# Patient Record
Sex: MALE | Race: WHITE | Employment: FULL TIME | URBAN - METROPOLITAN AREA
[De-identification: names, ages, dates, MRNs, and addresses within clinical notes are randomized per-mention and may not be internally consistent; named-entity substitution may affect disease eponyms.]

---

## 2018-09-24 ENCOUNTER — OFFICE VISIT (OUTPATIENT)
Dept: ORTHOPEDIC SURGERY | Age: 38
End: 2018-09-24
Payer: COMMERCIAL

## 2018-09-24 VITALS
HEIGHT: 73 IN | DIASTOLIC BLOOD PRESSURE: 100 MMHG | HEART RATE: 87 BPM | SYSTOLIC BLOOD PRESSURE: 127 MMHG | BODY MASS INDEX: 33.13 KG/M2 | WEIGHT: 250 LBS

## 2018-09-24 DIAGNOSIS — M22.2X2 PATELLOFEMORAL PAIN SYNDROME OF LEFT KNEE: ICD-10-CM

## 2018-09-24 DIAGNOSIS — M25.562 LEFT KNEE PAIN, UNSPECIFIED CHRONICITY: Primary | ICD-10-CM

## 2018-09-24 DIAGNOSIS — M65.9 SYNOVITIS OF LEFT KNEE: ICD-10-CM

## 2018-09-24 PROBLEM — M65.962 SYNOVITIS OF LEFT KNEE: Status: ACTIVE | Noted: 2018-09-24

## 2018-09-24 PROCEDURE — 99203 OFFICE O/P NEW LOW 30 MIN: CPT | Performed by: FAMILY MEDICINE

## 2018-09-24 PROCEDURE — 20610 DRAIN/INJ JOINT/BURSA W/O US: CPT | Performed by: FAMILY MEDICINE

## 2018-09-24 RX ORDER — DICLOFENAC SODIUM 75 MG/1
75 TABLET, DELAYED RELEASE ORAL 2 TIMES DAILY
Qty: 60 TABLET | Refills: 3 | Status: SHIPPED | OUTPATIENT
Start: 2018-09-24 | End: 2020-07-14

## 2018-09-24 NOTE — PROGRESS NOTES
BP: (!) 127/100   Pulse: 87       General Exam:     Constitutional: Patient is adequately groomed with no evidence of malnutrition  DTRs: Deep tendon reflexes are intact  Mental Status: The patient is oriented to time, place and person. The patient's mood and affect are appropriate. Lymphatic: The lymphatic examination bilaterally reveals all areas to be without enlargement or induration. Vascular: Examination reveals no swelling or calf tenderness. Peripheral pulses are palpable and 2+. Neurological: The patient has good coordination. There is no weakness or sensory deficit. Knee Examination  Inspection:  There is no high-grade deformity of the hamate does have a slight suprapatellar effusion. No erythematous changes or signs is a Septic joint. Palpation:  He does have tenderness of the lateral rhythm medial patellofemoral facet. Definite pain with patellar grind testing. He does not exhibit much in the way of joint line tenderness at this time. Rang of Motion:  He is able to completely extend but flexion is significantly limited to 60-70° resulting in anterior knee pain. Hamstrings are very tight. Strength:  4 out of 5 with flexion and extension. No evidence of extensor mechanism disruption. Special Tests:  He does have a very positive patellar grind test.  He does have some pain with Herman's testing but is primarily anterior in nature due to inability to fully flex his knee. I do not sense high-grade instability although he is guarding substantially. Skin: There are no rashes, ulcerations or lesions. Distal neurovascular exam is intact. Gait: Moderate altalgia    Reflex symmetrically preserved    Additional Comments:     Additional Examinations:  Contralateral Exam: Examination of the right knee reveals intact skin. There is no focal tenderness. The patient demonstrates full painless range of motion with regards to flexion and extension.   Strength is 5/5 thorough out all planes. Ligamentous stability is grossly intact. Examination of the bilateral hip reveals intact skin. The patient demonstrates full painless range of motion with regards to flexion, abduction, internal and external rotation. There is not tenderness about the greater trochanter. There is a negative straight leg raise against resistance. Strength is 5/5 thorough out all planes. Right Lower Extremity: Examination of the right lower extremity does not show any tenderness, deformity or injury. Range of motion is unremarkable. There is no gross instability. There are no rashes, ulcerations or lesions. Strength and tone are normal.  Left Lower Extremity: Examination of the left lower extremity does not show any tenderness, deformity or injury. Range of motion is unremarkable. There is no gross instability. There are no rashes, ulcerations or lesions. Strength and tone are normal.      Diagnostic Test Findings: Left knee AP and PA weight-bearing,sunrise, and lateral films are obtained today and does not show any evidence of high-grade degenerative changes or osseous injuries. If minimal tilt on sunrise view. Assessment :  #1.  3 days status post acute worsening left anterior knee pain with knee synovitis and suspected patellofemoral compression syndrome    Impression:  Encounter Diagnoses   Name Primary?     Left knee pain, unspecified chronicity Yes    Synovitis of left knee     Patellofemoral pain syndrome of left knee        Office Procedures:  Orders Placed This Encounter   Procedures    XR KNEE LEFT (MIN 4 VIEWS)    Uric Acid     Standing Status:   Future     Standing Expiration Date:   9/24/2019    CBC with Differential    Ambulatory referral to Physical Therapy     Referral Priority:   Routine     Referral Type:   Eval and Treat     Referral Reason:   Specialty Services Required     Requested Specialty:   Physical Therapy     Number of Visits Requested:   1    [id number] - CO DRAIN/INJECT LARGE JOINT/BURSA    IL BETAMETHASONE ACET&SOD PHOSP     Left hip       Treatment Plan:  Treatment options were discussed with Jay Marty. We did review his plain films and exam findings. He is certainly most clinically tender anteriorly involving the knee but there is no real history of injury or precipitating activity prior to becoming symptomatic this weekend. I would like for him to get blood work checking a uric acid and CBC. We started him on diclofenac 75 mg 1 pill twice daily and was encouraged to use his home wraparound knee brace which she has left over from his previous right knee surgery several years ago. We will see how he does initially with a couple weeks of supervised therapy and we did opt to inject his left knee today using 2 mL of Celestone, 2 of Marcaine, 1 of Xylocaine. We'll see him back in 2 weeks and consider imaging if he is failing to improve with he will contact us with questions or concerns. Icing and activity modification was discussed. This dictation was performed with a verbal recognition program (DRAGON) and it was checked for errors. It is possible that there are still dictated errors within this office note. If so, please bring any errors to my attention for an addendum. All efforts were made to ensure that this office note is accurate.

## 2019-03-18 ENCOUNTER — OFFICE VISIT (OUTPATIENT)
Dept: ORTHOPEDIC SURGERY | Age: 39
End: 2019-03-18
Payer: COMMERCIAL

## 2019-03-18 VITALS
HEIGHT: 73 IN | HEART RATE: 91 BPM | BODY MASS INDEX: 33.13 KG/M2 | SYSTOLIC BLOOD PRESSURE: 151 MMHG | DIASTOLIC BLOOD PRESSURE: 95 MMHG | WEIGHT: 250 LBS

## 2019-03-18 DIAGNOSIS — M25.561 PAIN IN JOINT OF RIGHT KNEE: Primary | ICD-10-CM

## 2019-03-18 PROCEDURE — 99213 OFFICE O/P EST LOW 20 MIN: CPT | Performed by: PHYSICIAN ASSISTANT

## 2019-03-18 RX ORDER — NAPROXEN 500 MG/1
500 TABLET ORAL 2 TIMES DAILY WITH MEALS
Qty: 60 TABLET | Refills: 0 | Status: SHIPPED | OUTPATIENT
Start: 2019-03-18 | End: 2020-07-14

## 2019-03-25 ENCOUNTER — OFFICE VISIT (OUTPATIENT)
Dept: ORTHOPEDIC SURGERY | Age: 39
End: 2019-03-25
Payer: COMMERCIAL

## 2019-03-25 VITALS — HEIGHT: 73 IN | WEIGHT: 250 LBS | BODY MASS INDEX: 33.13 KG/M2

## 2019-03-25 DIAGNOSIS — M25.561 PAIN IN JOINT OF RIGHT KNEE: Primary | ICD-10-CM

## 2019-03-25 DIAGNOSIS — M25.461 EFFUSION OF RIGHT KNEE: ICD-10-CM

## 2019-03-25 PROCEDURE — 99213 OFFICE O/P EST LOW 20 MIN: CPT | Performed by: ORTHOPAEDIC SURGERY

## 2019-03-25 PROCEDURE — 20610 DRAIN/INJ JOINT/BURSA W/O US: CPT | Performed by: ORTHOPAEDIC SURGERY

## 2019-04-10 ENCOUNTER — OFFICE VISIT (OUTPATIENT)
Dept: ORTHOPEDIC SURGERY | Age: 39
End: 2019-04-10
Payer: COMMERCIAL

## 2019-04-10 VITALS — WEIGHT: 250 LBS | HEIGHT: 72 IN | BODY MASS INDEX: 33.86 KG/M2

## 2019-04-10 DIAGNOSIS — M25.461 EFFUSION OF RIGHT KNEE: Primary | ICD-10-CM

## 2019-04-10 PROCEDURE — 99213 OFFICE O/P EST LOW 20 MIN: CPT | Performed by: ORTHOPAEDIC SURGERY

## 2019-04-10 NOTE — PROGRESS NOTES
than aspirin, has not seen Dr Basil Lomax recently but told Dr will give clearance for surgery    High blood pressure        Past Surgical History:  Past Surgical History:   Procedure Laterality Date    CORONARY ANGIOPLASTY WITH STENT PLACEMENT  2014    KNEE ARTHROSCOPY Right 09/22/2016    Medial Collateral Ligament Repair with removal of loose bodies       Allergies:  No Known Allergies    Medications:  Current Outpatient Medications   Medication Sig Dispense Refill    naproxen (NAPROSYN) 500 MG tablet Take 1 tablet by mouth 2 times daily (with meals) 60 tablet 0    diclofenac (VOLTAREN) 75 MG EC tablet Take 1 tablet by mouth 2 times daily 60 tablet 3    Multiple Vitamins-Minerals (THERAPEUTIC MULTIVITAMIN-MINERALS) tablet Take 1 tablet by mouth daily      aspirin 325 MG tablet Take 325 mg by mouth daily       No current facility-administered medications for this visit. Review of Systems:  Bhavnanaveed Kim review of systems has been performed by intake and observation. All past and current ROS forms have been scanned into the medical record. She has been instructed to contact her primary care provider regarding ROS issues if not already being addressed at this time. There are no recent changes. The most recent ROS was scanned into media on 03/18/2019       OBJECTIVE  PHYSICAL EXAM  Vital Signs: There were no vitals filed for this visit. Body mass index is 33.91 kg/m². General Exam:   Constitutional: Patient is adequately groomed with no evidence of malnutrition  Mental Status: The patient is oriented to time, place and person. The patient's mood and affect are appropriate. Vascular: Examination reveals no swelling or calf tenderness. Peripheral pulses are palpable and 2+. Neurological: The patient has good coordination. There is no weakness or sensory deficit. Right Knee Examination  Inspection:   Knee alignment: neutral  No swelling noted, no effusion  No erythema or ecchymosis. Skin is intact with no cellulitis, rashes, ulcerations, lymphedema or cutaneous lesions noted. Gait: Today's gait normal.    Palpation: No tenderness and no effusion. Range of Motion:  Full, but with discomfort with full flexion    Special Tests:  Lachman test: negative       Anterior drawer: negative       Posterior drawer: negative       Herman's test:Negative       Varus laxity at 30 degrees: negative       Valgus laxity at 30 degrees: positive    Strength: no gross motor weakness noted. Motor exam of the lower extremities show quadriceps, hamstrings, foot dorsiflexion and plantarflexion grossly intact. Neurologic & vascular: Sensation to both feet is grossly intact to light touch. The bilateral lower extremities are warm and well-perfused with brisk capillary refill. Additional Examinations:  Left Lower Extremity: Examination of the left lower extremity does not show any tenderness, deformity or injury. Range of motion is within normal limits. There is no gross instability. There are no rashes, ulcerations or lesions. Strength and tone are normal.        ASSESSMENT (Medical Decision Making)    Leonardo Madison is a 45 y.o. male with the following diagnosis: Right knee Symptoms have currently resolved. He has some minimal tightness posteriorly but not really pain. ICD-10-CM    1. Effusion of right knee M25.461        His overall course is responding adequately to ongoing treatment      PLAN (Medical Decision Making)  Office Procedures:  No orders of the defined types were placed in this encounter. Treatment Plan: Today we discussed his plan. We will allow him to continue to progress his activity level slowly. He has been using a simple knee support and at least for now we'll continue this knee support. His knee symptoms recur. Would suggest that he obtain an MRI scan. Ryan prepare for that. And then he'll follow-up with us for reevaluation.   Otherwise he can progress his activities as tolerated. Non-steroidal anti-inflammatories medications (NSAIDs) can be used to assist with pain control and to reduce inflammatory changes. These medications may be over-the-counter or prescribed. We discussed taking the NSAID properly and the precautions. The patient understands that this medication may potentially interfere with other medications. Patient was also instructed to immediately discontinue the medication is there is any possible complication. Kathe Amaya was instructed to call the office if his symptoms worsen or if new symptoms appear prior to the next scheduled visit. He is specifically instructed to contact the office between now and schedule appointment if he has concerns related to his condition or if he needs assistance in scheduling any above tests. He is welcome to call for an appointment sooner if he has any additional concerns or questions. This dictation was performed with a verbal recognition program (DRAGON) and it was checked for errors. It is possible that there are still dictated errors within this office note. If so, please bring any errors to my attention for an addendum. All efforts were made to ensure that this office note is accurate.

## 2020-03-06 ENCOUNTER — OFFICE VISIT (OUTPATIENT)
Dept: ORTHOPEDIC SURGERY | Age: 40
End: 2020-03-06
Payer: COMMERCIAL

## 2020-03-06 VITALS — BODY MASS INDEX: 33.86 KG/M2 | HEIGHT: 72 IN | WEIGHT: 250 LBS

## 2020-03-06 PROCEDURE — 99213 OFFICE O/P EST LOW 20 MIN: CPT | Performed by: PHYSICIAN ASSISTANT

## 2020-03-06 RX ORDER — ATORVASTATIN CALCIUM 80 MG/1
80 TABLET, FILM COATED ORAL DAILY
COMMUNITY
Start: 2019-09-13

## 2020-03-06 RX ORDER — METHYLPREDNISOLONE 4 MG/1
TABLET ORAL
Qty: 1 KIT | Refills: 0 | Status: SHIPPED | OUTPATIENT
Start: 2020-03-06 | End: 2020-06-17

## 2020-03-06 RX ORDER — CARVEDILOL 6.25 MG/1
6.25 TABLET ORAL DAILY
COMMUNITY
Start: 2017-12-20

## 2020-03-06 NOTE — PROGRESS NOTES
Patient: Aramis Vernon    MRN: [id number]  YOB: 1980          Age: 44 y.o. Sex: male    Subjective     Chief Complaint:  Hand Pain (L hand swelling and pain at 1st MCP joint; pain has been ongoing for 1 wk)      History of Present Illness:  Aramis Vernon is a 44 y.o. male who presents tonight for evaluation of left hand pain. Patient states that he has been experiencing pain over the dorsum of the left hand at the MCP joint of the left index finger. Patient denies any precipitating event or trauma to the left hand. He states that this is been getting worse over the past week. He denies any history of gout or significant medical issues. He states the pain is gradually gotten to the point where he is having severe pain with any range of motion of his left index finger. He is tender along the extensor tendon over the MCP joint of the left index finger. He has been icing the hand and he has been taking 800 mg of ibuprofen 3-4 times a day. He has had minimal benefit from the ibuprofen. Pain Assessment  Location of Pain: Hand  Location Modifiers: Left  Severity of Pain: 6  Quality of Pain: Throbbing  Duration of Pain: Persistent  Frequency of Pain: Constant  Date Pain First Started: 02/28/20  Aggravating Factors: Bending  Limiting Behavior: Yes  Result of Injury: No  Work-Related Injury: No      Medical History  Current Medications:   Current Outpatient Medications   Medication Sig Dispense Refill    atorvastatin (LIPITOR) 80 MG tablet Take 80 mg by mouth      carvedilol (COREG) 6.25 MG tablet Take 6.25 mg by mouth      methylPREDNISolone (MEDROL DOSEPACK) 4 MG tablet Take by mouth.  1 kit 0    aspirin 325 MG tablet Take 325 mg by mouth daily      naproxen (NAPROSYN) 500 MG tablet Take 1 tablet by mouth 2 times daily (with meals) 60 tablet 0    diclofenac (VOLTAREN) 75 MG EC tablet Take 1 tablet by mouth 2 times daily (Patient not taking: Reported on 3/6/2020) 60 tablet Left hand  Impression: There are no acute or subacute fractures or lytic or blastic lesions within the left hand. Assessment:  Extensor tendinitis versus synovitis left hand    Impression:   Encounter Diagnoses   Name Primary?  Hand pain, left Yes    Tendinitis of extensor tendon of left hand        Office Procedures:  Orders Placed This Encounter   Procedures    XR HAND LEFT (MIN 3 VIEWS)     Standing Status:   Future     Number of Occurrences:   1     Standing Expiration Date:   4/6/2020       Treatment Plan:  Patient was placed into a volar splint for his index finger and he is to come out of the splint several times a day to wash his hand and for icing. He was given a prescription for a Medrol Dosepak that he is to take as prescribed and he may take extra strength Tylenol 2 every 6 hours as needed for pain. He will follow-up with 1 of the hand surgeons next week further continue evaluation care. Moises De La Cruz PA-C    * Please note that some or all of this record was generated using voice recognition software. If there are any questions about the content of this document, please contact me as some errors in transcription may have occurred.

## 2020-03-16 ENCOUNTER — HOSPITAL ENCOUNTER (OUTPATIENT)
Dept: OCCUPATIONAL THERAPY | Age: 40
Setting detail: THERAPIES SERIES
Discharge: HOME | End: 2020-03-16
Payer: COMMERCIAL

## 2020-03-16 ENCOUNTER — OFFICE VISIT (OUTPATIENT)
Dept: ORTHOPEDIC SURGERY | Age: 40
End: 2020-03-16
Payer: COMMERCIAL

## 2020-03-16 VITALS — BODY MASS INDEX: 33.13 KG/M2 | WEIGHT: 250 LBS | HEIGHT: 73 IN

## 2020-03-16 PROCEDURE — L3913 HFO W/O JOINTS CF: HCPCS | Performed by: OCCUPATIONAL THERAPIST

## 2020-03-16 PROCEDURE — 99213 OFFICE O/P EST LOW 20 MIN: CPT | Performed by: ORTHOPAEDIC SURGERY

## 2020-03-16 RX ORDER — PREDNISONE 10 MG/1
TABLET ORAL
Qty: 10 TABLET | Refills: 0 | Status: SHIPPED | OUTPATIENT
Start: 2020-03-16 | End: 2020-06-17

## 2020-03-16 NOTE — PROGRESS NOTES
Assessment: Radial collateral ligament sprain with laxity of the left index MP joint with swelling over the origin of the collateral ligament    Treatment Plan: We discussed the protective measures necessary to allow ligamentous healing and we are going to fabricate a hand-based splint holding the index MP joint and slight radial deviation and 0 degrees extension. He may remove it for hygiene but I instructed him on preventing any ulnar deviating force on the index finger proximal phalanx. I am also going to give him a higher dose Medrol Dosepak to decrease the inflammation    Return in about 6 weeks (around 4/27/2020). Chief Complaint:  Hand Pain (left index finger)      History of Present Illness  Harish Perez is a 44 y.o. male. Location: left index finger Severity: moderate to severe pain Duration: several weeks  Modifying factors: pain and swelling, was unable to make a fist, went to the after hours clinic, the medrol dose pack and brace helped but still feels swollen, not as much pain  Associated symptoms: none    Contributory History  None    Medical History    Current Outpatient Medications on File Prior to Visit   Medication Sig Dispense Refill    atorvastatin (LIPITOR) 80 MG tablet Take 80 mg by mouth      carvedilol (COREG) 6.25 MG tablet Take 6.25 mg by mouth      methylPREDNISolone (MEDROL DOSEPACK) 4 MG tablet Take by mouth. 1 kit 0    diclofenac (VOLTAREN) 75 MG EC tablet Take 1 tablet by mouth 2 times daily 60 tablet 3    Multiple Vitamins-Minerals (THERAPEUTIC MULTIVITAMIN-MINERALS) tablet Take 1 tablet by mouth daily      aspirin 325 MG tablet Take 325 mg by mouth daily      naproxen (NAPROSYN) 500 MG tablet Take 1 tablet by mouth 2 times daily (with meals) 60 tablet 0     No current facility-administered medications on file prior to visit.       Past Medical History:   Diagnosis Date    Asthma     CAD (coronary artery disease)     does not take any meds other than aspirin, has not seen Dr Patricia Hansen recently but told Dr will give clearance for surgery    High blood pressure      No Known Allergies  Social History     Socioeconomic History    Marital status:      Spouse name: Not on file    Number of children: Not on file    Years of education: Not on file    Highest education level: Not on file   Occupational History    Not on file   Social Needs    Financial resource strain: Not on file    Food insecurity     Worry: Not on file     Inability: Not on file    Transportation needs     Medical: Not on file     Non-medical: Not on file   Tobacco Use    Smoking status: Never Smoker    Smokeless tobacco: Never Used   Substance and Sexual Activity    Alcohol use: No    Drug use: No    Sexual activity: Not on file   Lifestyle    Physical activity     Days per week: Not on file     Minutes per session: Not on file    Stress: Not on file   Relationships    Social connections     Talks on phone: Not on file     Gets together: Not on file     Attends Temple service: Not on file     Active member of club or organization: Not on file     Attends meetings of clubs or organizations: Not on file     Relationship status: Not on file    Intimate partner violence     Fear of current or ex partner: Not on file     Emotionally abused: Not on file     Physically abused: Not on file     Forced sexual activity: Not on file   Other Topics Concern    Not on file   Social History Narrative    Not on file     Family History   Problem Relation Age of Onset    Asthma Mother     High Blood Pressure Father        Patient's medications, allergies, past medical, surgical, social and family histories were reviewed and updated as appropriate.     Review of Systems  Pertinent items are noted in HPI  Denies fever chills, confusion and bowel and bladder active change  Complete Review of Systems reviewed from patient history form dated 36/2020 and available in the patients chart under the media tab. Vital Signs  Vitals:    03/16/20 1418   Weight: 250 lb (113.4 kg)   Height: 6' 1\" (1.854 m)     Body mass index is 32.98 kg/m². Physical Exam  Constitutional: Normal nutritional status  Mental Status: Alert and oriented no rashes or erythema  Skin: Rashes or erythema  Lymphatic: No lymphadenopathy    Right Hand Examination:  Inspection: Swelling  Finger Range of Motion: Full  Wrist Range of Motion: Normal normal capillary refill  Vascular Exam: Normal capillary refill  Neurologic Exam: Negative Tinel's and Phalen's  Intrinsic Muscle Strength: Normal  Extrinsic Muscle Strength: Normal  Special Tests: Stress testing the collateral ligament of the index MP joint with the MP in flexion shows he only opens about 3 to 5 degrees    Left Hand Examination:  Inspection: Swelling at the origin of the radial collateral ligament of the index MP  Finger Range of Motion: Full  Wrist Range of Motion: Normal  Vascular Exam: Normal capillary refill  Neurologic Exam: Negative Tinel's and Phalen's  Intrinsic Muscle Strength: Normal  Extrinsic Muscle Strength: Normal  Special Tests: Stress testing the radial collateral ligament shows definite laxity to about 15 degrees    Neck Exam: No pain full range of motion    Additional Comments:     Additional Examinations:  X-Ray Findings: PA lateral and oblique x-rays of the left hand had been done in after-hours clinic on March 6 he has a normal-appearing MP joint of the index finger  Additional Diagnostic Test Findings:    Office Procedures:      Time statement: This dictation was performed with a verbal recognition program. It is possible that there are still dictated errors within this office note. All efforts were made to ensure that this office note is accurate. No orders of the defined types were placed in this encounter.       Attestation: I have reviewed the chief complaint and history of present illness (including ROS and PFSH) and vital documentation by my staff and I agree with their documentation and have added where applicable.

## 2020-03-16 NOTE — PLAN OF CARE
[facility] [address] [facility] [address] [facility]  ________________________________________________________________________    Patient: Macrina Tobias   : 1980  MRN: [id number]  Referring Physician: Referring Practitioner: Brianne Ortiz    Evaluation Date: 3/16/2020      Medical Diagnosis Information:  Diagnosis: Y84.887V (ICD-10-CM) - Sprain of left index finger           Occupational Therapy Splint Certification Form  Dear Referring Practitioner: Brianne Ortiz,  The following patient has been evaluated for occupational therapy services for fabrication of a hand /finger based  brace. Insurance requires the referring physician to review the treatment plan. Please review the attached evaluation and/or summary of the patient's plan of care, and verify that you agree by signing the attached document and sending it back to our office. Plan of Care/Treatment to date:  [x] Fabrication of custom L Index hand splint With MP at 0 ext and slightly radially deviated and IP's free. [x] Instruction on splint use, care and wearing schedule        [] Follow up as needed for splint modifications          Frequency/Duration:  [x] One time visit for splint fabrication and instructions. Follow up as needed for splint modifications        [] Splint fabricated, patient to return for full evaluation. Rehab Potential: [] good [] fair  [] poor         SPLINT EVALUATION  Date: 3/16/2020  Name: Macrina Tobias            : 1980      Medical/Treatment Diagnosis Information:  · Diagnosis: J90.955R (ICD-10-CM) - Sprain of left index finger  Insurance/Certification information:     Physician Information:  Referring Practitioner: Brianne Ortiz       Next MD Appointment:     Subjective  History of Injury/ Mechanism of Injury:  2 weeks ago he noticed swelling in his MP of his L index. Is not sure how he injured L index.    Went to  After hours at [facility] who splinted him and referred him to Dr. Cherylene Callas who he saw today. Surgery Date: NA  Dominant Hand:    [x] Right []Left  Occupational/Vocational Status: Machine shop  Progress of any previous OT/PT: the patient []has/ [x]has not received OT/PT previously for this diagnosis. Pain: 1-3/10  Increases as the day progresses. Objective Findings:    Type of splint:  Hand/finger splint L index with MP at 0 and radially deviated and IP's free  Splint protocol utilization:   At all times except bathing  Splint Purpose: []Immobilize or protect [x]Promote healing of ligament []Relieve pain  []Provide support for improved hand function []Maximize joint motion    Treatment:   []Splint provided ([x]Customized/ []Prefabricated), and splint rationale explained. [x]Patient instructed in [x]wear/ [x]care of splint and educated regarding diagnosis. []Patient instructed in symptom reduction techniques   []HEP instruction    []Discussed ADL assistive device    Written Information Distributed: []HEP  [x]Splint care and wearing protocol    Patient response to evaluation and instructions:  [x]Attentive/interested   [x]Asked questions/ retained info  []Appeared disinterested  []Poor retention of information  []Appeared anxious/ fearful    Assessment and Plan:  Goals: [x]Patient will be able to verbalize rationale for, and demonstrate proper wearing     of splint. [x]Splint will provide proper fit and function. []Patient will be able to verbalize 2-3 ways to prevent further symptoms. [x]Patient will be able to don and doff independently. []Patient will be independent with HEP    Goals met:  [x]yes []no    Plan:  []Splint completed with good fit and function. Hand Therapy to follow up for     splint modifications as needed    []Splint completed; OT/PT evaluation initiated. Patient to return for further     treatment.     Rj Downing OTR/L [address] [facility]  [place] [id number]

## 2020-06-17 ENCOUNTER — OFFICE VISIT (OUTPATIENT)
Dept: ORTHOPEDIC SURGERY | Age: 40
End: 2020-06-17
Payer: COMMERCIAL

## 2020-06-17 VITALS — BODY MASS INDEX: 33.86 KG/M2 | HEIGHT: 72 IN | WEIGHT: 250 LBS

## 2020-06-17 PROCEDURE — 20610 DRAIN/INJ JOINT/BURSA W/O US: CPT | Performed by: ORTHOPAEDIC SURGERY

## 2020-06-17 RX ORDER — METHYLPREDNISOLONE 4 MG
TABLET, DOSE PACK ORAL
Qty: 1 KIT | Refills: 0 | Status: SHIPPED | OUTPATIENT
Start: 2020-06-17 | End: 2020-07-14

## 2020-06-17 NOTE — PROGRESS NOTES
no weakness or sensory deficit. Right Knee Examination  Inspection:   Knee alignment: neutral  He has a relatively tense effusion. No erythema or ecchymosis. Skin is intact with no cellulitis, rashes, ulcerations, lymphedema or cutaneous lesions noted. Gait: Today again he is ambulating with the assistance of a single crutch actually probably needs both of them. Palpation: Is a large effusion. Seems to be most tender along the medial joint line. Range of Motion: His range of motion significantly limited because of his large effusion. Special Tests:  Lachman test: negative       Anterior drawer: negative       Posterior drawer: negative       Herman's test: Not tested today. Varus laxity at 30 degrees: negative       Valgus laxity at 30 degrees: positive, grade 3. Strength: no gross motor weakness noted. Motor exam of the lower extremities show quadriceps, hamstrings, foot dorsiflexion and plantarflexion grossly intact. Neurologic & vascular: Sensation to both feet is grossly intact to light touch. The bilateral lower extremities are warm and well-perfused with brisk capillary refill. Additional Examinations:  Left Lower Extremity: Examination of the left lower extremity does not show any tenderness, deformity or injury. Range of motion is within normal limits. There is no gross instability. There are no rashes, ulcerations or lesions. Strength and tone are normal.      Diagnostics:  4 views of the right knee taken in the office today show evidence of previous MCL surgery with hardware in place. Minimal arthritic changes, no fracture or dislocation. ASSESSMENT (Medical Decision Making)    Abbie Corbin is a 44 y.o. male with the following diagnosis: Right knee Symptoms have currently resolved. He has some minimal tightness posteriorly but not really pain. ICD-10-CM    1.  Right knee pain, unspecified chronicity M25.561 XR KNEE RIGHT (MIN 4 VIEWS) placed in an Ace wrap for comfort. Wear that beneath his knee support. Follow-up 2 weeks    Yeison Llanes was instructed to call the office if his symptoms worsen or if new symptoms appear prior to the next scheduled visit. He is specifically instructed to contact the office between now and schedule appointment if he has concerns related to his condition or if he needs assistance in scheduling any above tests. He is welcome to call for an appointment sooner if he has any additional concerns or questions. This dictation was performed with a verbal recognition program (DRAGON) and it was checked for errors. It is possible that there are still dictated errors within this office note. If so, please bring any errors to my attention for an addendum. All efforts were made to ensure that this office note is accurate.

## 2020-07-09 ENCOUNTER — OFFICE VISIT (OUTPATIENT)
Dept: ORTHOPEDIC SURGERY | Age: 40
End: 2020-07-09
Payer: COMMERCIAL

## 2020-07-09 VITALS — HEIGHT: 72 IN | WEIGHT: 250 LBS | BODY MASS INDEX: 33.86 KG/M2

## 2020-07-09 PROCEDURE — 99214 OFFICE O/P EST MOD 30 MIN: CPT | Performed by: ORTHOPAEDIC SURGERY

## 2020-07-09 RX ORDER — METHYLPREDNISOLONE ACETATE 40 MG/ML
80 INJECTION, SUSPENSION INTRA-ARTICULAR; INTRALESIONAL; INTRAMUSCULAR; SOFT TISSUE ONCE
Status: SHIPPED | OUTPATIENT
Start: 2020-07-09

## 2020-07-09 RX ORDER — LIDOCAINE HYDROCHLORIDE 10 MG/ML
20 INJECTION, SOLUTION INFILTRATION; PERINEURAL ONCE
Status: SHIPPED | OUTPATIENT
Start: 2020-07-09

## 2020-07-09 RX ORDER — BUPIVACAINE HYDROCHLORIDE 2.5 MG/ML
60 INJECTION, SOLUTION INFILTRATION; PERINEURAL ONCE
Status: SHIPPED | OUTPATIENT
Start: 2020-07-09

## 2020-07-09 RX ORDER — METHYLPREDNISOLONE 4 MG/1
TABLET ORAL
Qty: 1 KIT | Refills: 0 | Status: SHIPPED | OUTPATIENT
Start: 2020-07-09 | End: 2020-07-14

## 2020-07-09 NOTE — PROGRESS NOTES
MD Rossy Rodriguez, [place]         Orthopaedic Surgery and Sports Medicine      Patient Name: Ender Molina  YOB: 1980  Date of Encounter: 7/9/2020  Patient's PCP is Desi Mckeon MD    SUBJECTIVE  Chief Complaint:  Knee Pain (RIGHT   MRI RESULTS)      History of Present Illness:  Ender Molina is a 44 y.o. male here regarding right knee pain. He was most recently seen on 6/17/2020. At that time he had a large effusion and pain. We did an aspiration and injection we received a large amount of serosanguineous fluid but no sign of infection. He did well for a few weeks but now his symptoms have recurred. He is not quite as severe as before but he does have a recurrent effusion. In the meantime he had an MRI scan performed and is here to follow-up for those results. He was previously seen several years ago he had surgery done elsewhere for a medial collateral ligament injury. He is done only reasonably well with that. Has had intermittent episodes of large effusions. He recently jumped down from the bed of a pickup truck had some initial discomfort but within 48 hours had a very large effusion. He is currently ambulating with 2 crutches. He does have a hinged brace that he has been attempting to use. Currently this brace is too tight and therefore he is going to use an Ace wrap for support. The patient has taken NSAIDs, naproxen    The patient is working, owns a machine shop. Patient does have a history of an MCL repair by Dr. Nicki Duval in 2016.      Pain Assessment:  Pain Assessment  Location of Pain: Knee  Location Modifiers: Right  Severity of Pain: 7  Quality of Pain: Dull, Aching  Frequency of Pain: Intermittent  Aggravating Factors: Standing, Walking, Stairs, Squatting, Kneeling, Bending  Relieving Factors: Rest, Ice, Nsaids  Result of Injury: No  Work-Related Injury: No  Are there other pain locations you wish to document?: No    Past Medical History:  Past Medical History:   Diagnosis Date    Asthma     CAD (coronary artery disease)     does not take any meds other than aspirin, has not seen Dr Velvet Cuenca recently but told Dr will give clearance for surgery    High blood pressure        Past Surgical History:  Past Surgical History:   Procedure Laterality Date    CORONARY ANGIOPLASTY WITH STENT PLACEMENT  2014    KNEE ARTHROSCOPY Right 09/22/2016    Medial Collateral Ligament Repair with removal of loose bodies       Allergies:  No Known Allergies    Medications:  Current Outpatient Medications   Medication Sig Dispense Refill    MEDROL, ALISON, 4 MG tablet Take by mouth. 1 kit 0    atorvastatin (LIPITOR) 80 MG tablet Take 80 mg by mouth      carvedilol (COREG) 6.25 MG tablet Take 6.25 mg by mouth      naproxen (NAPROSYN) 500 MG tablet Take 1 tablet by mouth 2 times daily (with meals) 60 tablet 0    diclofenac (VOLTAREN) 75 MG EC tablet Take 1 tablet by mouth 2 times daily 60 tablet 3    Multiple Vitamins-Minerals (THERAPEUTIC MULTIVITAMIN-MINERALS) tablet Take 1 tablet by mouth daily      aspirin 325 MG tablet Take 325 mg by mouth daily       No current facility-administered medications for this visit. Review of Systems:  Doc Jay Jay review of systems has been performed by intake and observation. All past and current ROS forms have been scanned into the medical record. She has been instructed to contact her primary care provider regarding ROS issues if not already being addressed at this time. There are no recent changes. The most recent ROS was scanned into media on 03/18/2019       OBJECTIVE  PHYSICAL EXAM  Vital Signs: There were no vitals filed for this visit. Body mass index is 33.91 kg/m². General Exam:   Constitutional: Patient is adequately groomed with no evidence of malnutrition  Mental Status:  The patient is oriented to time, place and person. The patient's mood and affect are appropriate. Vascular: Examination reveals no swelling or calf tenderness. Peripheral pulses are palpable and 2+. Neurological: The patient has good coordination. There is no weakness or sensory deficit. Right Knee Examination  Inspection:   Knee alignment: neutral  Moderate effusion today. No erythema or ecchymosis. Skin is intact with no cellulitis, rashes, ulcerations, lymphedema or cutaneous lesions noted. Gait: Today again he is ambulating with the assistance of 2 crutches     Palpation: Is a moderate effusion. Seems to be most tender along the medial joint line. Range of Motion: His range of motion significantly limited because of his effusion. Special Tests:  Lachman test: Mildly positive       Anterior drawer: Also mildly positive       Posterior drawer: negative       Herman's test: Clinically positive in the medial compartment       Varus laxity at 30 degrees: negative       Valgus laxity at 30 degrees: positive, grade 3. Strength: no gross motor weakness noted. Motor exam of the lower extremities show quadriceps, hamstrings, foot dorsiflexion and plantarflexion grossly intact. Neurologic & vascular: Sensation to both feet is grossly intact to light touch. The bilateral lower extremities are warm and well-perfused with brisk capillary refill. Additional Examinations:  Left Lower Extremity: Examination of the left lower extremity does not show any tenderness, deformity or injury. Range of motion is within normal limits. There is no gross instability. There are no rashes, ulcerations or lesions. Strength and tone are normal.    His plain radiographs which were taken on the last visit show evidence of his MCL reconstruction. He still has very good joint space. MRI scan which was performed at [facility] on 6/27/2020 shows some chondromalacia in all 3 compartments.   He does have a large joint effusion once again. He does have evidence of probably a small ruptured popliteal cyst.  Also has degeneration of his ACL with a significant synovitis surrounding that. His proximal MCL also is thickened but there is no evidence of a rerupture. ASSESSMENT (Medical Decision Making)    Venita Casanova is a 44 y.o. male with the following diagnosis: This is a gentleman who is normally pretty active and healthy who now is ambulating with 2 crutches because of his right knee discomfort. He has multiple abnormalities within his knee which includes chondromalacia throughout the knee although good joint space on his plain radiographs. He has a degenerative and probably nonfunctioning ACL. He had a previous MCL repair but it also is somewhat loose. He has a significant effusion with synovitis. He may have a medial meniscus tear associated with his MCL injury. PLAN (Medical Decision Making)  Office Procedures:  No orders of the defined types were placed in this encounter. Treatment Plan: Today we discussed his plan. I spent at least 25 minutes face to face with this patient today. Greater than 50% of that time was spent counseling, coordinating care, discussing and answering questions regarding the risks, benefits, and complications of reviews the results of his knee MRI scan. In detail. We discussed precautionary measures to prevent further injury, additional treatment options, we also discussed options of surgical intervention due to the severity of his problem and the minimal improvement with the injection on his last visit. Current plan is to proceed with a video arthroscopy for a relatively large synovectomy, will also require chondroplasty. May require additional debridement of his ACL with future ACL reconstruction. And he may require partial meniscectomy as well. After discussion today, the patient has elected to proceed with surgical intervention.  The surgical procedure was discussed in detail. The risks and possible complications of the surgery in general, as well as those directly related to this procedure were reviewed today. General risks include but are not limited to persistent pain, infection (including COVID-19), excessive blood loss, neurovascular injury, chronic swelling or edema, and the potential need for additional treatment or surgical intervention in the future. The patient understands that, again, the risks and possible complications are not limited to those specifically stated above. In addition today, we discussed the preoperative and postoperative protocol, the often lengthy recovery, and the expectation that the patient will be compliant with instructions and perform the appropriate rehab program as prescribed. The patient accepted the above risks, possible complications, and protocol and elects to proceed. All questions were answered appropriately, and they understand that they can contact the office at any time to further discuss any questions or concerns. He understands that this may be the first portion of a two-stage procedure and he may require an allograft ACL reconstruction at some point. Nilsa Giles was instructed to call the office if his symptoms worsen or if new symptoms appear prior to the next scheduled visit. He is specifically instructed to contact the office between now and schedule appointment if he has concerns related to his condition or if he needs assistance in scheduling any above tests. He is welcome to call for an appointment sooner if he has any additional concerns or questions. This dictation was performed with a verbal recognition program (DRAGON) and it was checked for errors. It is possible that there are still dictated errors within this office note. If so, please bring any errors to my attention for an addendum. All efforts were made to ensure that this office note is accurate.

## 2020-07-15 ENCOUNTER — OFFICE VISIT (OUTPATIENT)
Dept: PRIMARY CARE CLINIC | Age: 40
End: 2020-07-15
Payer: COMMERCIAL

## 2020-07-15 PROCEDURE — 99211 OFF/OP EST MAY X REQ PHY/QHP: CPT | Performed by: NURSE PRACTITIONER

## 2020-07-15 NOTE — PROGRESS NOTES
Perlita Jake received a viral test for COVID-19. They were educated on isolation and quarantine as appropriate. For any symptoms, they were directed to seek care from their PCP, given contact information to establish with a doctor, directed to an urgent care or the emergency room.

## 2020-07-17 ENCOUNTER — TELEPHONE (OUTPATIENT)
Dept: ORTHOPEDIC SURGERY | Age: 40
End: 2020-07-17

## 2020-07-17 NOTE — TELEPHONE ENCOUNTER
Auth: # [id number]    Date:   07/21/2020  Type of SX:  OP  Location: [facility]  CPT: 25192   [id number]  [id number]   DX Code: M22.41  M67.361   S83.411  SX area: VAS  RT knee partial menisectomy  Insurance: [employer]O

## 2020-07-19 LAB
SARS-COV-2: NOT DETECTED
SOURCE: NORMAL

## 2020-07-21 ENCOUNTER — HOSPITAL ENCOUNTER (OUTPATIENT)
Age: 40
Setting detail: OUTPATIENT SURGERY
Discharge: HOME | End: 2020-07-21
Attending: ORTHOPAEDIC SURGERY | Admitting: ORTHOPAEDIC SURGERY
Payer: COMMERCIAL

## 2020-07-21 ENCOUNTER — ANESTHESIA EVENT (OUTPATIENT)
Dept: OPERATING ROOM | Age: 40
End: 2020-07-21
Payer: COMMERCIAL

## 2020-07-21 ENCOUNTER — ANESTHESIA (OUTPATIENT)
Dept: OPERATING ROOM | Age: 40
End: 2020-07-21
Payer: COMMERCIAL

## 2020-07-21 VITALS
SYSTOLIC BLOOD PRESSURE: 140 MMHG | DIASTOLIC BLOOD PRESSURE: 99 MMHG | HEART RATE: 75 BPM | TEMPERATURE: 97.3 F | RESPIRATION RATE: 15 BRPM | HEIGHT: 72 IN | WEIGHT: 250 LBS | BODY MASS INDEX: 33.86 KG/M2 | OXYGEN SATURATION: 98 %

## 2020-07-21 VITALS
RESPIRATION RATE: 10 BRPM | TEMPERATURE: 98.6 F | DIASTOLIC BLOOD PRESSURE: 90 MMHG | OXYGEN SATURATION: 100 % | SYSTOLIC BLOOD PRESSURE: 141 MMHG

## 2020-07-21 PROCEDURE — 6360000002 HC RX W HCPCS: Performed by: ORTHOPAEDIC SURGERY

## 2020-07-21 PROCEDURE — 6360000002 HC RX W HCPCS: Performed by: NURSE ANESTHETIST, CERTIFIED REGISTERED

## 2020-07-21 PROCEDURE — 89060 EXAM SYNOVIAL FLUID CRYSTALS: CPT

## 2020-07-21 PROCEDURE — 3600000014 HC SURGERY LEVEL 4 ADDTL 15MIN: Performed by: ORTHOPAEDIC SURGERY

## 2020-07-21 PROCEDURE — 87070 CULTURE OTHR SPECIMN AEROBIC: CPT

## 2020-07-21 PROCEDURE — 88305 TISSUE EXAM BY PATHOLOGIST: CPT

## 2020-07-21 PROCEDURE — 87015 SPECIMEN INFECT AGNT CONCNTJ: CPT

## 2020-07-21 PROCEDURE — 7100000010 HC PHASE II RECOVERY - FIRST 15 MIN: Performed by: ORTHOPAEDIC SURGERY

## 2020-07-21 PROCEDURE — 87077 CULTURE AEROBIC IDENTIFY: CPT

## 2020-07-21 PROCEDURE — 3700000001 HC ADD 15 MINUTES (ANESTHESIA): Performed by: ORTHOPAEDIC SURGERY

## 2020-07-21 PROCEDURE — 87205 SMEAR GRAM STAIN: CPT

## 2020-07-21 PROCEDURE — 2580000003 HC RX 258: Performed by: ANESTHESIOLOGY

## 2020-07-21 PROCEDURE — 3600000004 HC SURGERY LEVEL 4 BASE: Performed by: ORTHOPAEDIC SURGERY

## 2020-07-21 PROCEDURE — 3700000000 HC ANESTHESIA ATTENDED CARE: Performed by: ORTHOPAEDIC SURGERY

## 2020-07-21 PROCEDURE — 6370000000 HC RX 637 (ALT 250 FOR IP): Performed by: ANESTHESIOLOGY

## 2020-07-21 PROCEDURE — 87102 FUNGUS ISOLATION CULTURE: CPT

## 2020-07-21 PROCEDURE — 2580000003 HC RX 258: Performed by: ORTHOPAEDIC SURGERY

## 2020-07-21 PROCEDURE — 2709999900 HC NON-CHARGEABLE SUPPLY: Performed by: ORTHOPAEDIC SURGERY

## 2020-07-21 PROCEDURE — 7100000000 HC PACU RECOVERY - FIRST 15 MIN: Performed by: ORTHOPAEDIC SURGERY

## 2020-07-21 PROCEDURE — 2500000003 HC RX 250 WO HCPCS: Performed by: NURSE ANESTHETIST, CERTIFIED REGISTERED

## 2020-07-21 PROCEDURE — 2500000003 HC RX 250 WO HCPCS: Performed by: ORTHOPAEDIC SURGERY

## 2020-07-21 PROCEDURE — 87075 CULTR BACTERIA EXCEPT BLOOD: CPT

## 2020-07-21 PROCEDURE — 87116 MYCOBACTERIA CULTURE: CPT

## 2020-07-21 PROCEDURE — 87206 SMEAR FLUORESCENT/ACID STAI: CPT

## 2020-07-21 PROCEDURE — 7100000011 HC PHASE II RECOVERY - ADDTL 15 MIN: Performed by: ORTHOPAEDIC SURGERY

## 2020-07-21 PROCEDURE — 7100000001 HC PACU RECOVERY - ADDTL 15 MIN: Performed by: ORTHOPAEDIC SURGERY

## 2020-07-21 RX ORDER — OXYCODONE HYDROCHLORIDE AND ACETAMINOPHEN 5; 325 MG/1; MG/1
2 TABLET ORAL PRN
Status: COMPLETED | OUTPATIENT
Start: 2020-07-21 | End: 2020-07-21

## 2020-07-21 RX ORDER — PROMETHAZINE HYDROCHLORIDE 25 MG/ML
6.25 INJECTION, SOLUTION INTRAMUSCULAR; INTRAVENOUS
Status: DISCONTINUED | OUTPATIENT
Start: 2020-07-21 | End: 2020-07-21 | Stop reason: HOSPADM

## 2020-07-21 RX ORDER — SODIUM CHLORIDE 0.9 % (FLUSH) 0.9 %
10 SYRINGE (ML) INJECTION PRN
Status: DISCONTINUED | OUTPATIENT
Start: 2020-07-21 | End: 2020-07-21 | Stop reason: HOSPADM

## 2020-07-21 RX ORDER — MORPHINE SULFATE 2 MG/ML
1 INJECTION, SOLUTION INTRAMUSCULAR; INTRAVENOUS EVERY 5 MIN PRN
Status: DISCONTINUED | OUTPATIENT
Start: 2020-07-21 | End: 2020-07-21 | Stop reason: HOSPADM

## 2020-07-21 RX ORDER — MEPERIDINE HYDROCHLORIDE 50 MG/ML
12.5 INJECTION INTRAMUSCULAR; INTRAVENOUS; SUBCUTANEOUS EVERY 5 MIN PRN
Status: DISCONTINUED | OUTPATIENT
Start: 2020-07-21 | End: 2020-07-21 | Stop reason: HOSPADM

## 2020-07-21 RX ORDER — PROPOFOL 10 MG/ML
INJECTION, EMULSION INTRAVENOUS PRN
Status: DISCONTINUED | OUTPATIENT
Start: 2020-07-21 | End: 2020-07-21 | Stop reason: SDUPTHER

## 2020-07-21 RX ORDER — SODIUM CHLORIDE, SODIUM LACTATE, POTASSIUM CHLORIDE, CALCIUM CHLORIDE 600; 310; 30; 20 MG/100ML; MG/100ML; MG/100ML; MG/100ML
INJECTION, SOLUTION INTRAVENOUS CONTINUOUS
Status: DISCONTINUED | OUTPATIENT
Start: 2020-07-21 | End: 2020-07-21 | Stop reason: HOSPADM

## 2020-07-21 RX ORDER — METHYLPREDNISOLONE ACETATE 80 MG/ML
INJECTION, SUSPENSION INTRA-ARTICULAR; INTRALESIONAL; INTRAMUSCULAR; SOFT TISSUE PRN
Status: DISCONTINUED | OUTPATIENT
Start: 2020-07-21 | End: 2020-07-21 | Stop reason: ALTCHOICE

## 2020-07-21 RX ORDER — LIDOCAINE HYDROCHLORIDE 20 MG/ML
INJECTION, SOLUTION INFILTRATION; PERINEURAL PRN
Status: DISCONTINUED | OUTPATIENT
Start: 2020-07-21 | End: 2020-07-21 | Stop reason: SDUPTHER

## 2020-07-21 RX ORDER — KETOROLAC TROMETHAMINE 30 MG/ML
INJECTION, SOLUTION INTRAMUSCULAR; INTRAVENOUS PRN
Status: DISCONTINUED | OUTPATIENT
Start: 2020-07-21 | End: 2020-07-21 | Stop reason: SDUPTHER

## 2020-07-21 RX ORDER — HYDRALAZINE HYDROCHLORIDE 20 MG/ML
INJECTION INTRAMUSCULAR; INTRAVENOUS PRN
Status: DISCONTINUED | OUTPATIENT
Start: 2020-07-21 | End: 2020-07-21 | Stop reason: SDUPTHER

## 2020-07-21 RX ORDER — OXYCODONE HYDROCHLORIDE AND ACETAMINOPHEN 5; 325 MG/1; MG/1
1 TABLET ORAL PRN
Status: COMPLETED | OUTPATIENT
Start: 2020-07-21 | End: 2020-07-21

## 2020-07-21 RX ORDER — LIDOCAINE HYDROCHLORIDE 10 MG/ML
0.3 INJECTION, SOLUTION EPIDURAL; INFILTRATION; INTRACAUDAL; PERINEURAL
Status: DISCONTINUED | OUTPATIENT
Start: 2020-07-21 | End: 2020-07-21 | Stop reason: HOSPADM

## 2020-07-21 RX ORDER — ONDANSETRON 2 MG/ML
4 INJECTION INTRAMUSCULAR; INTRAVENOUS PRN
Status: DISCONTINUED | OUTPATIENT
Start: 2020-07-21 | End: 2020-07-21 | Stop reason: HOSPADM

## 2020-07-21 RX ORDER — ONDANSETRON 2 MG/ML
INJECTION INTRAMUSCULAR; INTRAVENOUS PRN
Status: DISCONTINUED | OUTPATIENT
Start: 2020-07-21 | End: 2020-07-21 | Stop reason: SDUPTHER

## 2020-07-21 RX ORDER — FENTANYL CITRATE 50 UG/ML
INJECTION, SOLUTION INTRAMUSCULAR; INTRAVENOUS PRN
Status: DISCONTINUED | OUTPATIENT
Start: 2020-07-21 | End: 2020-07-21 | Stop reason: SDUPTHER

## 2020-07-21 RX ORDER — OXYCODONE HYDROCHLORIDE AND ACETAMINOPHEN 5; 325 MG/1; MG/1
1 TABLET ORAL EVERY 6 HOURS PRN
Qty: 28 TABLET | Refills: 0 | Status: SHIPPED | OUTPATIENT
Start: 2020-07-21 | End: 2020-07-28

## 2020-07-21 RX ORDER — DEXAMETHASONE SODIUM PHOSPHATE 10 MG/ML
INJECTION INTRAMUSCULAR; INTRAVENOUS PRN
Status: DISCONTINUED | OUTPATIENT
Start: 2020-07-21 | End: 2020-07-21 | Stop reason: SDUPTHER

## 2020-07-21 RX ORDER — BUPIVACAINE HYDROCHLORIDE 2.5 MG/ML
INJECTION, SOLUTION INFILTRATION; PERINEURAL PRN
Status: DISCONTINUED | OUTPATIENT
Start: 2020-07-21 | End: 2020-07-21 | Stop reason: ALTCHOICE

## 2020-07-21 RX ORDER — DIPHENHYDRAMINE HYDROCHLORIDE 50 MG/ML
12.5 INJECTION INTRAMUSCULAR; INTRAVENOUS
Status: DISCONTINUED | OUTPATIENT
Start: 2020-07-21 | End: 2020-07-21 | Stop reason: HOSPADM

## 2020-07-21 RX ORDER — ROCURONIUM BROMIDE 10 MG/ML
INJECTION, SOLUTION INTRAVENOUS PRN
Status: DISCONTINUED | OUTPATIENT
Start: 2020-07-21 | End: 2020-07-21 | Stop reason: SDUPTHER

## 2020-07-21 RX ORDER — MIDAZOLAM HYDROCHLORIDE 1 MG/ML
INJECTION INTRAMUSCULAR; INTRAVENOUS PRN
Status: DISCONTINUED | OUTPATIENT
Start: 2020-07-21 | End: 2020-07-21 | Stop reason: SDUPTHER

## 2020-07-21 RX ORDER — SODIUM CHLORIDE 0.9 % (FLUSH) 0.9 %
10 SYRINGE (ML) INJECTION EVERY 12 HOURS SCHEDULED
Status: DISCONTINUED | OUTPATIENT
Start: 2020-07-21 | End: 2020-07-21 | Stop reason: HOSPADM

## 2020-07-21 RX ORDER — HYDROMORPHONE HCL 110MG/55ML
PATIENT CONTROLLED ANALGESIA SYRINGE INTRAVENOUS PRN
Status: DISCONTINUED | OUTPATIENT
Start: 2020-07-21 | End: 2020-07-21 | Stop reason: SDUPTHER

## 2020-07-21 RX ORDER — LABETALOL HYDROCHLORIDE 5 MG/ML
INJECTION, SOLUTION INTRAVENOUS PRN
Status: DISCONTINUED | OUTPATIENT
Start: 2020-07-21 | End: 2020-07-21 | Stop reason: SDUPTHER

## 2020-07-21 RX ORDER — LABETALOL HYDROCHLORIDE 5 MG/ML
5 INJECTION, SOLUTION INTRAVENOUS EVERY 10 MIN PRN
Status: DISCONTINUED | OUTPATIENT
Start: 2020-07-21 | End: 2020-07-21 | Stop reason: HOSPADM

## 2020-07-21 RX ORDER — SODIUM CHLORIDE, SODIUM LACTATE, POTASSIUM CHLORIDE, AND CALCIUM CHLORIDE .6; .31; .03; .02 G/100ML; G/100ML; G/100ML; G/100ML
IRRIGANT IRRIGATION PRN
Status: DISCONTINUED | OUTPATIENT
Start: 2020-07-21 | End: 2020-07-21 | Stop reason: ALTCHOICE

## 2020-07-21 RX ORDER — MORPHINE SULFATE 2 MG/ML
2 INJECTION, SOLUTION INTRAMUSCULAR; INTRAVENOUS EVERY 5 MIN PRN
Status: DISCONTINUED | OUTPATIENT
Start: 2020-07-21 | End: 2020-07-21 | Stop reason: HOSPADM

## 2020-07-21 RX ORDER — HYDRALAZINE HYDROCHLORIDE 20 MG/ML
5 INJECTION INTRAMUSCULAR; INTRAVENOUS EVERY 10 MIN PRN
Status: DISCONTINUED | OUTPATIENT
Start: 2020-07-21 | End: 2020-07-21 | Stop reason: HOSPADM

## 2020-07-21 RX ADMIN — KETOROLAC TROMETHAMINE 30 MG: 30 INJECTION, SOLUTION INTRAMUSCULAR; INTRAVENOUS at 10:14

## 2020-07-21 RX ADMIN — FENTANYL CITRATE 100 MCG: 50 INJECTION, SOLUTION INTRAMUSCULAR; INTRAVENOUS at 09:50

## 2020-07-21 RX ADMIN — OXYCODONE HYDROCHLORIDE AND ACETAMINOPHEN 1 TABLET: 5; 325 TABLET ORAL at 10:54

## 2020-07-21 RX ADMIN — HYDRALAZINE HYDROCHLORIDE 5 MG: 20 INJECTION INTRAMUSCULAR; INTRAVENOUS at 10:23

## 2020-07-21 RX ADMIN — LIDOCAINE HYDROCHLORIDE 3 ML: 20 INJECTION, SOLUTION INFILTRATION; PERINEURAL at 09:52

## 2020-07-21 RX ADMIN — FENTANYL CITRATE 100 MCG: 50 INJECTION, SOLUTION INTRAMUSCULAR; INTRAVENOUS at 09:52

## 2020-07-21 RX ADMIN — LABETALOL HYDROCHLORIDE 5 MG: 5 INJECTION, SOLUTION INTRAVENOUS at 10:23

## 2020-07-21 RX ADMIN — FENTANYL CITRATE 50 MCG: 50 INJECTION, SOLUTION INTRAMUSCULAR; INTRAVENOUS at 09:57

## 2020-07-21 RX ADMIN — ROCURONIUM BROMIDE 20 MG: 10 SOLUTION INTRAVENOUS at 09:52

## 2020-07-21 RX ADMIN — PROPOFOL 300 MG: 10 INJECTION, EMULSION INTRAVENOUS at 09:52

## 2020-07-21 RX ADMIN — DEXAMETHASONE SODIUM PHOSPHATE 10 MG: 10 INJECTION INTRAMUSCULAR; INTRAVENOUS at 09:59

## 2020-07-21 RX ADMIN — CEFAZOLIN SODIUM 2 G: 10 INJECTION, POWDER, FOR SOLUTION INTRAVENOUS at 09:49

## 2020-07-21 RX ADMIN — MIDAZOLAM HYDROCHLORIDE 2 MG: 2 INJECTION, SOLUTION INTRAMUSCULAR; INTRAVENOUS at 09:47

## 2020-07-21 RX ADMIN — ONDANSETRON 4 MG: 2 INJECTION INTRAMUSCULAR; INTRAVENOUS at 09:59

## 2020-07-21 RX ADMIN — SODIUM CHLORIDE, POTASSIUM CHLORIDE, SODIUM LACTATE AND CALCIUM CHLORIDE: 600; 310; 30; 20 INJECTION, SOLUTION INTRAVENOUS at 09:17

## 2020-07-21 RX ADMIN — SODIUM CHLORIDE, POTASSIUM CHLORIDE, SODIUM LACTATE AND CALCIUM CHLORIDE: 600; 310; 30; 20 INJECTION, SOLUTION INTRAVENOUS at 10:23

## 2020-07-21 RX ADMIN — FENTANYL CITRATE 100 MCG: 50 INJECTION, SOLUTION INTRAMUSCULAR; INTRAVENOUS at 10:06

## 2020-07-21 RX ADMIN — HYDROMORPHONE HYDROCHLORIDE 1 MG: 2 INJECTION INTRAMUSCULAR; INTRAVENOUS; SUBCUTANEOUS at 10:14

## 2020-07-21 ASSESSMENT — PULMONARY FUNCTION TESTS
PIF_VALUE: 4
PIF_VALUE: 20
PIF_VALUE: 18
PIF_VALUE: 4
PIF_VALUE: 22
PIF_VALUE: 3
PIF_VALUE: 6
PIF_VALUE: 2
PIF_VALUE: 4
PIF_VALUE: 2
PIF_VALUE: 3
PIF_VALUE: 2
PIF_VALUE: 4
PIF_VALUE: 20
PIF_VALUE: 24
PIF_VALUE: 0
PIF_VALUE: 5
PIF_VALUE: 5
PIF_VALUE: 0
PIF_VALUE: 3
PIF_VALUE: 2
PIF_VALUE: 24
PIF_VALUE: 21
PIF_VALUE: 3
PIF_VALUE: 2
PIF_VALUE: 2
PIF_VALUE: 4
PIF_VALUE: 3
PIF_VALUE: 21
PIF_VALUE: 6
PIF_VALUE: 13
PIF_VALUE: 3
PIF_VALUE: 4
PIF_VALUE: 3
PIF_VALUE: 1
PIF_VALUE: 0
PIF_VALUE: 0
PIF_VALUE: 14
PIF_VALUE: 5
PIF_VALUE: 6
PIF_VALUE: 4
PIF_VALUE: 4
PIF_VALUE: 5

## 2020-07-21 ASSESSMENT — PAIN SCALES - GENERAL
PAINLEVEL_OUTOF10: 0
PAINLEVEL_OUTOF10: 5
PAINLEVEL_OUTOF10: 0
PAINLEVEL_OUTOF10: 5
PAINLEVEL_OUTOF10: 4

## 2020-07-21 ASSESSMENT — PAIN DESCRIPTION - DESCRIPTORS: DESCRIPTORS: ACHING

## 2020-07-21 ASSESSMENT — PAIN - FUNCTIONAL ASSESSMENT: PAIN_FUNCTIONAL_ASSESSMENT: 0-10

## 2020-07-21 NOTE — ANESTHESIA PRE PROCEDURE
Department of Anesthesiology  Preprocedure Note       Name:  Catalino Warren   Age:  36 y.o.  :  1980                                          MRN:  [id number]         Date:  2020      Surgeon: Georgia Bragg):  Gary Carcamo MD    Procedure: Procedure(s):  VIDEO ARTHROSCOPY RIGHT KNEE, CHONDROPLASTY, PARTIAL SYNOVECTOMY, POSSIBLE SYNOVIAL BIOPSY, POSSIBLE PARTIAL MENISCECTOMY    Medications prior to admission:   Prior to Admission medications    Medication Sig Start Date End Date Taking?  Authorizing Provider   atorvastatin (LIPITOR) 80 MG tablet Take 80 mg by mouth daily  19   Historical Provider, MD   carvedilol (COREG) 6.25 MG tablet Take 6.25 mg by mouth daily  17   Historical Provider, MD   Multiple Vitamins-Minerals (THERAPEUTIC MULTIVITAMIN-MINERALS) tablet Take 1 tablet by mouth daily    Historical Provider, MD   aspirin 325 MG tablet Take 325 mg by mouth daily    Historical Provider, MD       Current medications:    Current Facility-Administered Medications   Medication Dose Route Frequency Provider Last Rate Last Dose    ceFAZolin (ANCEF) 2 g in dextrose 5 % 100 mL IVPB  2 g Intravenous On Call to [address] Bautista MD        lactated ringers infusion   Intravenous Continuous Kulwant Calixto MD        sodium chloride flush 0.9 % injection 10 mL  10 mL Intravenous 2 times per day Kulwant Calixto MD        sodium chloride flush 0.9 % injection 10 mL  10 mL Intravenous PRN Kulwant Calixto MD        lidocaine PF 1 % injection 0.3 mL  0.3 mL Intradermal Once PRN Kulwant Calixto MD           Allergies:  No Known Allergies    Problem List:    Patient Active Problem List   Diagnosis Code    Patellofemoral pain syndrome of left knee M22.2X2    Synovitis of left knee M65.9    Left knee pain M25.562       Past Medical History:        Diagnosis Date    Asthma     as a child    CAD (coronary artery disease)     does not take any meds other than aspirin, has not seen Dr Lockett Gravely recently but told Dr will give clearance for surgery    High blood pressure     Hyperlipidemia        Past Surgical History:        Procedure Laterality Date    CORONARY ANGIOPLASTY WITH STENT PLACEMENT  2014    KNEE ARTHROSCOPY Right 09/22/2016    Medial Collateral Ligament Repair with removal of loose bodies       Social History:    Social History     Tobacco Use    Smoking status: Never Smoker    Smokeless tobacco: Never Used   Substance Use Topics    Alcohol use: No                                Counseling given: Not Answered      Vital Signs (Current):   Vitals:    07/14/20 1241   Weight: 250 lb (113.4 kg)   Height: 6' (1.829 m)                                              BP Readings from Last 3 Encounters:   03/18/19 (!) 151/95   09/24/18 (!) 127/100   10/07/16 138/87       NPO Status:                                                                                 BMI:   Wt Readings from Last 3 Encounters:   07/14/20 250 lb (113.4 kg)   07/09/20 250 lb (113.4 kg)   06/17/20 250 lb (113.4 kg)     Body mass index is 33.91 kg/m². CBC:   Lab Results   Component Value Date    WBC 7.3 11/03/2011    RBC 4.95 11/03/2011    HGB 14.9 11/03/2011    HCT 42.9 11/03/2011    MCV 86.7 11/03/2011    RDW 12.3 11/03/2011     11/03/2011       CMP:   Lab Results   Component Value Date     11/03/2011    K 4.8 11/03/2011     11/03/2011    CO2 32 11/03/2011    BUN 11 11/03/2011    CREATININE 0.9 11/03/2011    GFRAA >60 11/03/2011    AGRATIO 1.5 11/03/2011    GLUCOSE 85 11/03/2011    PROT 7.9 11/03/2011    CALCIUM 10.4 11/03/2011    BILITOT 0.40 11/03/2011    ALKPHOS 77 11/03/2011    AST 26 11/03/2011    ALT 42 11/03/2011       POC Tests: No results for input(s): POCGLU, POCNA, POCK, POCCL, POCBUN, POCHEMO, POCHCT in the last 72 hours.     Coags: No results found for: PROTIME, INR, APTT    HCG (If Applicable): No results found for: PREGTESTUR, PREGSERUM, HCG, HCGQUANT     ABGs: No results found for: PHART, PO2ART, MOM5YOZ, USD6SXJ, BEART, X5EVMEMZ     Type & Screen (If Applicable):  No results found for: LABABO, LABRH    Drug/Infectious Status (If Applicable):  No results found for: HIV, HEPCAB    COVID-19 Screening (If Applicable):   Lab Results   Component Value Date    COVID19 Not Detected 07/15/2020     CABG/stent (distal LAD stent 3/2014):,          Stress echo Study Date: 07/21/2014 08:18    Interpetation Summary: 1. Negative stress echo with no indication of inducible ischemia. 2.Negative ECG for ischemia with graded exercise test.   3.Rocha Treadmill Score is 12 which indicates low risk. 4.Excellent exercise tolerance with attainment of a high workload    Anesthesia Evaluation  Patient summary reviewed and Nursing notes reviewed no history of anesthetic complications:   Airway: Mallampati: II  TM distance: >3 FB   Neck ROM: full  Mouth opening: > = 3 FB Dental:          Pulmonary: breath sounds clear to auscultation  (+) asthma:                           ROS comment: Childhood asthma   Cardiovascular:  Exercise tolerance: good (>4 METS),   (+) hypertension:, CAD:, CABG/stent: no interval change, hyperlipidemia        Rhythm: regular  Rate: normal                 ROS comment: Cardiac clearance on chart     Neuro/Psych:   Negative Neuro/Psych ROS              GI/Hepatic/Renal: Neg GI/Hepatic/Renal ROS            Endo/Other: Negative Endo/Other ROS                    Abdominal:           Vascular: negative vascular ROS. 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   BMI:  Body mass index is 33.91 kg/m².      Vitals:    07/14/20 1241 Weight: 250 lb (113.4 kg)   Height: 6' (1.829 m)       No Known Allergies    Social History     Tobacco Use    Smoking status: Never Smoker    Smokeless tobacco: Never Used   Substance Use Topics    Alcohol use: No       LABS:    CBC  Lab Results   Component Value Date/Time    WBC 7.3 11/03/2011 12:14 PM    HGB 14.9 11/03/2011 12:14 PM    HCT 42.9 11/03/2011 12:14 PM     11/03/2011 12:14 PM     RENAL  Lab Results   Component Value Date/Time     11/03/2011 12:14 PM    K 4.8 11/03/2011 12:14 PM     11/03/2011 12:14 PM    CO2 32 11/03/2011 12:14 PM    BUN 11 11/03/2011 12:14 PM    CREATININE 0.9 11/03/2011 12:14 PM    GLUCOSE 85 11/03/2011 12:14 PM     COAGS  No results found for: PROTIME, INR, APTT   Pre-Operative Diagnosis: Tear of MCL (medial collateral ligament) of knee, right, initial encounter [S83.411A]; 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   BMI:  Body mass index is 33.91 kg/m².      Vitals:    07/14/20 1241   Weight: 250 lb (113.4 kg)   Height: 6' (1.829 m)       No Known Allergies    Social History     Tobacco Use    Smoking status: Never Smoker    Smokeless tobacco: Never Used   Substance Use Topics    Alcohol use: No       LABS:    CBC  Lab Results   Component Value Date/Time    WBC 7.3 11/03/2011 12:14 PM    HGB 14.9 11/03/2011 12:14 PM    HCT 42.9 11/03/2011 12:14 PM     11/03/2011 12:14 PM     RENAL  Lab Results   Component Value Date/Time     11/03/2011 12:14 PM    K 4.8 11/03/2011 12:14 PM     11/03/2011 12:14 PM    CO2 32 11/03/2011 12:14 PM    BUN 11 11/03/2011 12:14 PM    CREATININE 0.9 11/03/2011 12:14 PM    GLUCOSE 85 11/03/2011 12:14 PM     COAGS  No results found for: PROTIME, INR, APTT      Baldomero Cornelius MD   7/21/2020

## 2020-07-21 NOTE — H&P
I have reviewed the history and physical and examined the patient and find no relevant changes. I have reviewed with the patient and/or family the risks, benefits, and alternatives to the procedure.     Sylvia Scott MD  7/21/2020

## 2020-07-22 NOTE — OP NOTE
[facility]                 [place]                                 OPERATIVE REPORT    PATIENT NAME: Kimberley Chambers               :        1980  MED REC NO:   [id number]                          ROOM:  ACCOUNT NO:   [de-identified]                           ADMIT DATE: 2020  PROVIDER:     Lucian Mckenna MD    DATE OF PROCEDURE:  2020    LOCATION:  [facility] [place]. PREOPERATIVE DIAGNOSES:  1. Right knee lateral meniscus tear. 2.  Right knee recurrent effusions. 3.  Right knee hypertrophic synovitis. 4.  Possible gout, right knee. POSTOPERATIVE DIAGNOSES:  1. Right knee lateral meniscus tear. 2.  Right knee recurrent effusions. 3.  Right knee hypertrophic synovitis. 4.  Possible gout, right knee. PROCEDURE:  1.  Video arthroscopy of the right knee with a partial lateral  meniscectomy, code number is 71081.  2.  Right knee major synovectomy involving two or more compartments,  code number is 24390. SURGEON:  Lucian Mckenna MD    ANESTHESIA:  General.    ESTIMATED BLOOD LOSS:  Negligible. COMPLICATIONS:  No complications. INDICATIONS:  This is a gentleman who I started seeing as earlier this  year because he was having recurrent large effusions. He had no signs  of infection. We did some aspirations in the office, which identified  serosanguineous fluid. He would respond to the aspiration and  injection, but then weeks later his symptoms would recur. He has been  taking naproxen with some improvement, but certainly not a dramatic  improvement. He has been treated with oral steroids with some success. Also important is, he has had a previous coronary artery angioplasty  with stent placement in . He had an MRI scan performed, which  showed the hypertrophic synovitis and some inflammatory changes. He had  evidence of tricompartmental chondromalacia.   He had some mucoid  degeneration of his ACL. We talked to him about video arthroscopy  because he continued to have these recurrent effusions despite our  nonoperative treatment. He elected to proceed with surgery. The risk  and possible complications were all discussed and understood  preoperatively. OPERATIVE PROCEDURE:  He was seen in the holding area. We identified  the right extremity as being the operative extremity. He was taken to  the operating room for general anesthesia. The extremity was then  placed in a leg louis and prepped and draped sterilely. The standard  anteromedial, anterolateral, and superomedial portals were made. The  arthroscope was introduced into the suprapatellar pouch. Initially,  there was a pretty dramatic amount of hypertrophic synovitis with  evidence of what appeared to be calcific changes or crystalline deposits  within the synovium. We then passed down to the patellofemoral joint,  where there was evidence of chondrocalcinosis with calcium deposits or  crystalline deposits within the articular cartilage. In the medial  compartment, the articular cartilage again throughout the entire joint  had this chondrocalcinosis both within the meniscus and within the  articular cartilage. He did not have a significant tear of the medial  meniscus. We passed in the intercondylar notch, where the synovium over  the ACL and PCL was significantly inflamed and then in the lateral  compartment we did have a radial flap tear of the lateral meniscus,  which was primarily involving the posterior horn. The anterior and  posterior roots were intact. A partial lateral meniscectomy was  performed. We then proceeded with the extensive synovectomy that was  performed in the anterior aspect of the knee, both medial and lateral  gutters, and the entire suprapatellar pouch.   This was somewhat  time-consuming, but we did also place a collection bag on the shaver so  that all of this synovium could be

## 2020-07-23 LAB
CRYSTAL CMT 2: NORMAL
CRYSTALS, FLUID: NORMAL
PATH CONSULT FLUID: NO
SOURCE BODY FLUID: NORMAL

## 2020-07-24 ENCOUNTER — OFFICE VISIT (OUTPATIENT)
Dept: ORTHOPEDIC SURGERY | Age: 40
End: 2020-07-24

## 2020-07-24 VITALS — WEIGHT: 250 LBS | BODY MASS INDEX: 33.86 KG/M2 | HEIGHT: 72 IN

## 2020-07-24 PROCEDURE — 99024 POSTOP FOLLOW-UP VISIT: CPT | Performed by: ORTHOPAEDIC SURGERY

## 2020-07-24 RX ORDER — INDOMETHACIN 75 MG/1
CAPSULE, EXTENDED RELEASE ORAL
Qty: 30 CAPSULE | Refills: 0 | Status: SHIPPED | OUTPATIENT
Start: 2020-07-24 | End: 2020-08-10 | Stop reason: SDUPTHER

## 2020-07-24 NOTE — PROGRESS NOTES
MD Tolu [place], [place]         Orthopaedic Surgery and Sports Medicine    Patient Name: Donna Holman  YOB: 1980  Patient's PCP is Harry Palmer MD      SUBJECTIVE  Chief Complaint  Post op left knee arthroscopy with partial lateral menisectomy. 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 Date of Surgery: 7/21/2020    History of Present Illness:  Donna Holman is a 36 y.o. male  Here for first follow up of left knee arthroscopy with partial lateral menisectomy. Has not yet started outpatient physical therapy but is planning to within the next few days. The patient is currently ambulating with one crutch     The patient's pain is rated at 6/10. The patient denies fever, wound drainage, increasing redness, pus, increasing swelling. Post op problems reported: none. Taking oral pain medication as needed. Using local cold therapy as needed. Pain Assessment:  Pain Assessment  Location of Pain: Knee  Location Modifiers: Right  Severity of Pain: 0  Relieving Factors: Rest, Ice  Result of Injury: Yes  Work-Related Injury: No  Are there other pain locations you wish to document?: No        OBJECTIVE  PHYSICAL EXAM  Vital Signs: There were no vitals filed for this visit. Body mass index is 33.91 kg/m². General Appearance: Patient is adequately groomed with no evidence of malnutrition   Orientation: Patient is alert and oriented to person, place and time  Mood and Affect: Neutral/Euthymic (normal)     Surgically affected Knee Examination:  Dressing removed today. Portals are healing well. Portal sutures in place. Skin is intact. Minimal ecchymosis. Sensation is intact to light touch throughout lower extremity. The portals are clean and dry without drainage. There is no warmth, erythema, or purulent drainage over the incision. Patient tolerated gentle passive range of motion. Intra-operative findings were discussed. DIAGNOSTICS:   No new xrays taken today        ASSESSMENT (Medical Decision Making)    Gabi August is a 36 y.o. male progressing well from left knee arthroscopy with partial lateral menisectomy. He also had significant synovectomy performed as well. His pathology shows urate crystals which is probably the cause of the majority of his discomfort. PLAN (Medical Decision Making)  Office Procedures:  No orders of the defined types were placed in this encounter. Treatment Plan:  Sutures remain intact. Today I talked with him about his diagnosis of gout. He was fully unaware of that possibility. He is being started on Indocin 75 mg extended release. We talked him about his diet he is going to investigate that and and also about hydration. He will follow-up with us in 2 weeks for suture removal.  He may require further evaluation by primary care physician. Specific precautions were reviewed and emphasized. Patient will begin outpatient physical therapy. Follow up appointment was arranged at patient's convenience in 2 weeks. Non-steroidal anti-inflammatories medications (NSAIDs) can be used to assist with pain control and to reduce inflammatory changes. These medications may be over-the-counter or prescribed. We discussed taking the NSAID properly and the precautions. The patient understands that this medication may potentially interfere with other medications. Patient was also instructed to immediately discontinue the medication is there is any possible complication. The patient was informed to contact us if any problems arise or any new questions. All questions were answered today. This dictation was performed with a verbal recognition program (DRAGON) and it was checked for errors. It is possible that there are still dictated errors within this office note.  If so, please bring any errors to my attention for an addendum. All efforts were made to ensure that this office note is accurate.

## 2020-07-27 ENCOUNTER — HOSPITAL ENCOUNTER (OUTPATIENT)
Dept: PHYSICAL THERAPY | Age: 40
Setting detail: THERAPIES SERIES
Discharge: HOME | End: 2020-07-27
Payer: COMMERCIAL

## 2020-07-27 PROCEDURE — 97110 THERAPEUTIC EXERCISES: CPT

## 2020-07-27 PROCEDURE — 97530 THERAPEUTIC ACTIVITIES: CPT

## 2020-07-27 PROCEDURE — 97161 PT EVAL LOW COMPLEX 20 MIN: CPT

## 2020-07-27 NOTE — FLOWSHEET NOTE
[facility],  [address]  Phone: [phone]  Fax [phone]    Physical Therapy Treatment Note/ Progress Report:           Date:  2020    Patient Name:  Catalino Warren    :  1980  MRN: [id number]  Restrictions/Precautions:    Medical/Treatment Diagnosis Information:  · Diagnosis: M22.41 Chondromalacia of R knee; s/p R lateral menisectomy on   · Treatment Diagnosis: M25.561 R knee pain  Insurance/Certification information:  PT Insurance Information: [employer]  Physician Information:  Referring Practitioner: Kasey Forbes MD  Has the plan of care been signed (Y/N):        []  Yes  [x]  No     Date of Patient follow up with Physician: 8/10/2020      Is this a Progress Report:     []  Yes  [x]  No        If Yes:  Date Range for reporting period:  Beginning 2020  Ending    Progress report will be due (10 Rx or 30 days whichever is less):        Recertification will be due (POC Duration  / 90 days whichever is less): 10/27/2020      Visit # Insurance Allowable Requires auth   1 [employer] 40 visits    []no        []yes:     Functional Scale: LEFS: 47/80; 42% disability     Date assessed:  2020      Therapy Diagnosis/Practice Pattern: I     Number of Comorbidities:  []0     [x]1-2    []3+    Latex Allergy:  [x]NO      []YES  Preferred Language for Healthcare:   [x]English       []other:      Pain level:  1-2/10     SUBJECTIVE:  See eval    OBJECTIVE: See eval   Observation:    Test measurements:         PT Practice Pattern: I  Co morbidities: 1-2  surgical   INITIAL VISIT  CURRENT VISIT   PAIN  1-2/10     FUNCTIONAL SCALE LEFS       ROM R knee ext Lacking 13 deg       R knee flexion 102 deg                                     STRENGHT (MMT) R knee ext 4-/5       R hip ER 4/5                                                   RESTRICTIONS/PRECAUTIONS: s/p R lateral menisectomy ; (15697)Reviewed/Progressed HEP activities related to improving balance, coordination, kinesthetic sense, posture, motor skill, proprioception of core, proximal hip and LE for self care, mobility, lifting, and ambulation/stair navigation      Manual Treatments:  PROM / STM / Oscillations-Mobs:  G-I, II, III, IV (PA's, Inf., Post.)  [] (64502) Provided manual therapy to mobilize LE, proximal hip and/or LS spine soft tissue/joints for the purpose of modulating pain, promoting relaxation,  increasing ROM, reducing/eliminating soft tissue swelling/inflammation/restriction, improving soft tissue extensibility and allowing for proper ROM for normal function with self care, mobility, lifting and ambulation. Modalities:     [] GAME READY (VASO)- for significant edema, swelling, pain control. Charges:  Timed Code Treatment Minutes: 23'   Total Treatment Minutes: 39'     [facility] time in/time out:   (and requires time in and out for each CPT code)    [x] EVAL (LOW) 32996 (typically 20 minutes face-to-face)  [] EVAL (MOD) 02883 (typically 30 minutes face-to-face)  [] EVAL (HIGH) 52639 (typically 45 minutes face-to-face)  [] RE-EVAL     [x] PU(15208) x   1  [] IONTO  [] NMR (29777) x     [] VASO  [] Manual (36038) x      [] Other:  [x] TA x  1    [] Mech Traction (85197)  [] ES(attended) (54984)      [] ES (un) (35336):       GOALS:   Patient stated goal: Patient will be able to squat down without limitations and pain in his R knee. [] Progressing: [] Met: [] Not Met: [] Adjusted    Therapist goals for Patient:   Short Term Goals: To be achieved in: 2 weeks  1. Independent in HEP and progression per patient tolerance, in order to prevent re-injury. [] Progressing: [] Met: [] Not Met: [] Adjusted  2. Patient will have a decrease in pain to facilitate improvement in movement, function, and ADLs as indicated by Functional Deficits. [] Progressing: [] Met: [] Not Met: [] Adjusted    Long Term Goals:  To be achieved in: 4 weeks  1. Disability index score of 21% or less for the LEFS to assist with reaching prior level of function. [] Progressing: [] Met: [] Not Met: [] Adjusted  2. Patient will demonstrate increased AROM to 140 deg of R knee flexion to allow for proper joint functioning as indicated by patients Functional Deficits. [] Progressing: [] Met: [] Not Met: [] Adjusted  3. Patient will demonstrate an increase in Strength to good proximal hip strength and control, to 5/5 in R LE to allow for proper functional mobility as indicated by patients Functional Deficits. [] Progressing: [] Met: [] Not Met: [] Adjusted  4. Patient will return to all functional activities without increased symptoms or restriction. [] Progressing: [] Met: [] Not Met: [] Adjusted  5. Patient will be able to kneel down without pain and limitations in his R knee(patient specific functional goal)    [] Progressing: [] Met: [] Not Met: [] Adjusted       Progression Towards Functional goals:  [] Patient is progressing as expected towards functional goals listed. [] Progression is slowed due to complexities listed. [] Progression has been slowed due to co-morbidities. [x] Plan just implemented, too soon to assess goals progression  [] Other:         Overall Progression Towards Functional goals/ Treatment Progress Update:  [] Patient is progressing as expected towards functional goals listed. [] Progression is slowed due to complexities/Impairments listed. [] Progression has been slowed due to co-morbidities.   [x] Plan just implemented, too soon to assess goals progression <30days   [] Goals require adjustment due to lack of progress  [] Patient is not progressing as expected and requires additional follow up with physician  [] Other    Prognosis for POC: [x] Good [] Fair  [] Poor      Patient requires continued skilled intervention: [x] Yes  [] No    Treatment/Activity Tolerance:  [x] Patient able to complete treatment  [] Patient limited by fatigue  [] Patient limited by pain    [] Patient limited by other medical complications  [] Other:     ASSESSMENT: Patient demonstrates post op limitations following R lateral menisectomy on 7/21 with ROM limitations into flexion and extension as well as strength deficits which have led to abnormal gait mechanics. He has a diagnosis of gout which may limit his progress with ROM. Patient was educated on current condition, POC, expectations for therapy, and HEP. Return to Play: (if applicable)   []  Stage 1: Intro to Strength   []  Stage 2: Return to Run and Strength   []  Stage 3: Return to Jump and Strength   []  Stage 4: Dynamic Strength and Agility   []  Stage 5: Sport Specific Training     []  Ready to Return to Play, Meets All Above Stages   []  Not Ready for Return to Sports   Comments:                               PLAN: See eval  [] Continue per plan of care [] Alter current plan (see comments above)  [x] Plan of care initiated [] Hold pending MD visit [] Discharge      Electronically signed by:  Paras George PT, DPT OCS [id number]     Note: If patient does not return for scheduled/ recommended follow up visits, this note will serve as a discharge from care along with most recent update on progress.

## 2020-07-27 NOTE — PLAN OF CARE
rest  Provocative factors: bending, squatting, kneeling      Type: []Constant   [x]Intermittent  []Radiating []Localized []other:     Numbness/Tingling: none     Occupation/School:      Living Status/Prior Level of Function: Independent with ADLs and IADLs     OBJECTIVE:     ROM LEFT RIGHT   HIP Flex     HIP Abd     HIP Ext     HIP IR     HIP ER     Knee ext 0 deg  Lacking 13 deg    Knee Flex 145 deg  102 deg    Ankle PF     Ankle DF     Ankle In     Ankle Ev     Strength  LEFT RIGHT   HIP Flexors 5/5 4+/5   HIP Abductors     HIP Ext     Hip ER 5/5 4/5   Knee EXT (quad) 5/5 4-/5 dif w quad set   Knee Flex (HS) 5/5 5/5   Ankle DF 5/5 5/5   Ankle PF 5/5 5/5   Ankle Inv     Ankle EV          Circumference  Mid apex  7 cm prox             Reflexes/Sensation:    []Dermatomes/Myotomes intact    [x]Reflexes equal and normal bilaterally   []Other:    Joint mobility: R tibiofemoral    []Normal    [x]Hypo   []Hyper    Palpation: ttp to R quads     Functional Mobility/Transfers: WFL    Posture: WFL    Bandages/Dressings/Incisions: good signs of tissue healing     Gait: (include devices/WB status) limited R knee extension in R stance, limited R knee flexion     Orthopedic Special Tests: N/A post op                        [x] Patient history, allergies, meds reviewed. Medical chart reviewed. See intake form. Review Of Systems (ROS):  [x]Performed Review of systems (Integumentary, CardioPulmonary, Neurological) by intake and observation. Intake form has been scanned into medical record. Patient has been instructed to contact their primary care physician regarding ROS issues if not already being addressed at this time.       Co-morbidities/Complexities (which will affect course of rehabilitation):   []None           Arthritic conditions   []Rheumatoid arthritis (M05.9)  []Osteoarthritis (M19.91)   Cardiovascular conditions   [x]Hypertension (I10)  []Hyperlipidemia (E78.5)  []Angina pectoris (I20)  []Atherosclerosis (I70)   Musculoskeletal conditions   []Disc pathology   []Congenital spine pathologies   []Prior surgical intervention  []Osteoporosis (M81.8)  []Osteopenia (M85.8)   Endocrine conditions   []Hypothyroid (E03.9)  []Hyperthyroid Gastrointestinal conditions   []Constipation (B31.46)   Metabolic conditions   []Morbid obesity (E66.01)  []Diabetes type 1(E10.65) or 2 (E11.65)   []Neuropathy (G60.9)     Pulmonary conditions   []Asthma (J45)  []Coughing   []COPD (J44.9)   Psychological Disorders  []Anxiety (F41.9)  []Depression (F32.9)   []Other:   [x]Other:     Gout, stent placement       Barriers to/and or personal factors that will affect rehab potential:              []Age  []Sex              []Motivation/Lack of Motivation                        [x]Co-Morbidities              []Cognitive Function, education/learning barriers              []Environmental, home barriers              []profession/work barriers  []past PT/medical experience  []other:  Justification: Patient is expected to rehab well with skilled PT however may be limited secondary to gout diagnosis. Falls Risk Assessment (30 days):   [x] Falls Risk assessed and no intervention required.   [] Falls Risk assessed and Patient requires intervention due to being higher risk   TUG score (>12s at risk):     [] Falls education provided, including           ASSESSMENT:   Functional Impairments:     [x]Noted lumbar/proximal hip/LE joint hypomobility   [x]Decreased LE functional ROM   [x]Decreased core/proximal hip strength and neuromuscular control   [x]Decreased LE functional strength   [x]Reduced balance/proprioceptive control   []other:      Functional Activity Limitations (from functional questionnaire and intake)   [x]Reduced ability to tolerate prolonged functional positions   [x]Reduced ability or difficulty with changes of positions or transfers between positions   []Reduced ability to maintain good posture and demonstrate good body mechanics with sitting, bending, and lifting   []Reduced ability to sleep   [] Reduced ability or tolerance with driving and/or computer work   [x]Reduced ability to perform lifting, carrying tasks   [x]Reduced ability to squat   []Reduced ability to forward bend   [x]Reduced ability to ambulate prolonged functional periods/distances/surfaces   [x]Reduced ability to ascend/descend stairs   [x]Reduced ability to run, hop, cut or jump   []other:    Participation Restrictions   []Reduced participation in self care activities   [x]Reduced participation in home management activities   [x]Reduced participation in work activities   []Reduced participation in social activities. [x]Reduced participation in sport/recreation activities. Classification :    [x]Signs/symptoms consistent with post-surgical status including decreased ROM, strength and function.    []Signs/symptoms consistent with joint sprain/strain   []Signs/symptoms consistent with patella-femoral syndrome   []Signs/symptoms consistent with knee OA/hip OA   []Signs/symptoms consistent with internal derangement of knee/Hip   [x]Signs/symptoms consistent with functional hip weakness/NMR control      []Signs/symptoms consistent with tendinitis/tendinosis    []signs/symptoms consistent with pathology which may benefit from Dry needling      []other:      Prognosis/Rehab Potential:      []Excellent   [x]Good    []Fair   []Poor    Tolerance of evaluation/treatment:    []Excellent   [x]Good    []Fair   []Poor  Physical Therapy Evaluation Complexity Justification  [x] A history of present problem with:  [] no personal factors and/or comorbidities that impact the plan of care;  [x]1-2 personal factors and/or comorbidities that impact the plan of care  []3 personal factors and/or comorbidities that impact the plan of care  [x] An examination of body systems using standardized tests and measures addressing any of the following: body structures and functions (impairments), activity limitations, and/or participation restrictions;:  [x] a total of 1-2 or more elements   [] a total of 3 or more elements   [] a total of 4 or more elements   [x] A clinical presentation with:  [x] stable and/or uncomplicated characteristics   [] evolving clinical presentation with changing characteristics  [] unstable and unpredictable characteristics;   [x] Clinical decision making of [x] low, [] moderate, [] high complexity using standardized patient assessment instrument and/or measurable assessment of functional outcome. [x] EVAL (LOW) 75128 (typically 20 minutes face-to-face)  [] EVAL (MOD) 57298 (typically 30 minutes face-to-face)  [] EVAL (HIGH) 03820 (typically 45 minutes face-to-face)  [] RE-EVAL       PLAN:   Frequency/Duration:  2 days per week for 4 Weeks:  Interventions:  [x]  Therapeutic exercise including: strength training, ROM, for Lower extremity and core   [x]  NMR activation and proprioception for LE, Glutes and Core   [x]  Manual therapy as indicated for LE, Hip and spine to include: Dry Needling/IASTM, STM, PROM, Gr I-IV mobilizations, manipulation. [x] Modalities as needed that may include: thermal agents, E-stim, Biofeedback, US, iontophoresis as indicated  [x] Patient education on joint protection, postural re-education, activity modification, progression of HEP. HEP instruction: Daryl Holter: [id number]  (see scanned forms)    GOALS:  Patient stated goal: Patient will be able to squat down without limitations and pain in his R knee. [] Progressing: [] Met: [] Not Met: [] Adjusted    Therapist goals for Patient:   Short Term Goals: To be achieved in: 2 weeks  1. Independent in HEP and progression per patient tolerance, in order to prevent re-injury. [] Progressing: [] Met: [] Not Met: [] Adjusted  2. Patient will have a decrease in pain to facilitate improvement in movement, function, and ADLs as indicated by Functional Deficits.   [] Progressing: [] Met: [] Not Met: [] Adjusted    Long Term Goals: To be achieved in: 4 weeks  1. Disability index score of 21% or less for the LEFS to assist with reaching prior level of function. [] Progressing: [] Met: [] Not Met: [] Adjusted  2. Patient will demonstrate increased AROM to 140 deg of R knee flexion to allow for proper joint functioning as indicated by patients Functional Deficits. [] Progressing: [] Met: [] Not Met: [] Adjusted  3. Patient will demonstrate an increase in Strength to good proximal hip strength and control, to 5/5 in R LE to allow for proper functional mobility as indicated by patients Functional Deficits. [] Progressing: [] Met: [] Not Met: [] Adjusted  4. Patient will return to all functional activities without increased symptoms or restriction. [] Progressing: [] Met: [] Not Met: [] Adjusted  5.  Patient will be able to kneel down without pain and limitations in his R knee(patient specific functional goal)    [] Progressing: [] Met: [] Not Met: [] Adjusted     Electronically signed by:  Jaspreet Ruth, PT, DPT [facility] [id number]

## 2020-07-30 ENCOUNTER — HOSPITAL ENCOUNTER (OUTPATIENT)
Dept: PHYSICAL THERAPY | Age: 40
Setting detail: THERAPIES SERIES
Discharge: HOME | End: 2020-07-30
Payer: COMMERCIAL

## 2020-07-30 LAB
ANAEROBIC CULTURE: NORMAL
CULTURE SURGICAL: NORMAL
GRAM STAIN RESULT: NORMAL

## 2020-07-30 PROCEDURE — 97140 MANUAL THERAPY 1/> REGIONS: CPT

## 2020-07-30 PROCEDURE — 97110 THERAPEUTIC EXERCISES: CPT

## 2020-07-30 NOTE — FLOWSHEET NOTE
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; s/p R lateral menisectomy on   · Treatment Diagnosis: M25.561 R knee pain  Insurance/Certification information:  PT Insurance Information: [employer]  Physician Information:  Referring Practitioner: Gurvinder Moss MD  Has the plan of care been signed (Y/N):        []  Yes  [x]  No     Date of Patient follow up with Physician: 8/10/2020      Is this a Progress Report:     []  Yes  [x]  No        If Yes:  Date Range for reporting period:  Beginning 2020  Ending    Progress report will be due (10 Rx or 30 days whichever is less):        Recertification will be due (POC Duration  / 90 days whichever is less): 10/27/2020      Visit # Insurance Allowable Requires auth   2 [employer] 40 visits    []no        []yes:     Functional Scale: LEFS: 47/80; 42% disability     Date assessed:  2020      Therapy Diagnosis/Practice Pattern: I     Number of Comorbidities:  []0     [x]1-2    []3+    Latex Allergy:  [x]NO      []YES  Preferred Language for Healthcare:   [x]English       []other:      Pain level:  0/10     SUBJECTIVE:  Patient notes no pain this date and has no problems with his HEP.      OBJECTIVE: See eval   Observation:    Test measurements:         PT Practice Pattern: I  Co morbidities: 1-2  surgical   INITIAL VISIT  CURRENT VISIT   PAIN  1-2/10     FUNCTIONAL SCALE LEFS       ROM R knee ext Lacking 13 deg       R knee flexion 102 deg                                     STRENGHT (MMT) R knee ext 4-/5       R hip ER 4/5 ambulation re-education including up and down stairs     Home Exercise Program:    [x] (85718) Reviewed/Progressed HEP activities related to strengthening, flexibility, endurance, ROM of core, proximal hip and LE for functional self-care, mobility, lifting and ambulation/stair navigation   [] (47785)Reviewed/Progressed HEP activities related to improving balance, coordination, kinesthetic sense, posture, motor skill, proprioception of core, proximal hip and LE for self care, mobility, lifting, and ambulation/stair navigation      Manual Treatments:  PROM / STM / Oscillations-Mobs:  G-I, II, III, IV (PA's, Inf., Post.)  [] (91213) Provided manual therapy to mobilize LE, proximal hip and/or LS spine soft tissue/joints for the purpose of modulating pain, promoting relaxation,  increasing ROM, reducing/eliminating soft tissue swelling/inflammation/restriction, improving soft tissue extensibility and allowing for proper ROM for normal function with self care, mobility, lifting and ambulation. Modalities:  Declined    [] GAME READY (VASO)- for significant edema, swelling, pain control. Charges:  Timed Code Treatment Minutes: 39'   Total Treatment Minutes: 39'     [facility] time in/time out:   (and requires time in and out for each CPT code)    [] EVAL (LOW) 95876 (typically 20 minutes face-to-face)  [] EVAL (MOD) 55796 (typically 30 minutes face-to-face)  [] EVAL (HIGH) 13168 (typically 45 minutes face-to-face)  [] RE-EVAL     [x] CL(35446) x   2  [] IONTO  [] NMR (18463) x     [] VASO  [x] Manual (82886) x 1     [] Other:  [] TA x      [] Mech Traction (34671)  [] ES(attended) (79132)      [] ES (un) (75112):       GOALS:   Patient stated goal: Patient will be able to squat down without limitations and pain in his R knee. [] Progressing: [] Met: [] Not Met: [] Adjusted    Therapist goals for Patient:   Short Term Goals: To be achieved in: 2 weeks  1.  Independent in HEP and progression per patient tolerance, in order to prevent re-injury. [] Progressing: [] Met: [] Not Met: [] Adjusted  2. Patient will have a decrease in pain to facilitate improvement in movement, function, and ADLs as indicated by Functional Deficits. [] Progressing: [] Met: [] Not Met: [] Adjusted    Long Term Goals: To be achieved in: 4 weeks  1. Disability index score of 21% or less for the LEFS to assist with reaching prior level of function. [] Progressing: [] Met: [] Not Met: [] Adjusted  2. Patient will demonstrate increased AROM to 140 deg of R knee flexion to allow for proper joint functioning as indicated by patients Functional Deficits. [] Progressing: [] Met: [] Not Met: [] Adjusted  3. Patient will demonstrate an increase in Strength to good proximal hip strength and control, to 5/5 in R LE to allow for proper functional mobility as indicated by patients Functional Deficits. [] Progressing: [] Met: [] Not Met: [] Adjusted  4. Patient will return to all functional activities without increased symptoms or restriction. [] Progressing: [] Met: [] Not Met: [] Adjusted  5. Patient will be able to kneel down without pain and limitations in his R knee(patient specific functional goal)    [] Progressing: [] Met: [] Not Met: [] Adjusted       Progression Towards Functional goals:  [] Patient is progressing as expected towards functional goals listed. [] Progression is slowed due to complexities listed. [] Progression has been slowed due to co-morbidities. [x] Plan just implemented, too soon to assess goals progression  [] Other:         Overall Progression Towards Functional goals/ Treatment Progress Update:  [] Patient is progressing as expected towards functional goals listed. [] Progression is slowed due to complexities/Impairments listed. [] Progression has been slowed due to co-morbidities.   [x] Plan just implemented, too soon to assess goals progression <30days   [] Goals require adjustment due to lack of progress  [] Patient is not progressing as expected and requires additional follow up with physician  [] Other    Prognosis for POC: [x] Good [] Fair  [] Poor      Patient requires continued skilled intervention: [x] Yes  [] No    Treatment/Activity Tolerance:  [x] Patient able to complete treatment  [] Patient limited by fatigue  [] Patient limited by pain    [] Patient limited by other medical complications  [] Other:     ASSESSMENT: Patient demonstrates post op limitations following R lateral menisectomy on 7/21 with ROM limitations into flexion and extension as well as strength deficits which have led to abnormal gait mechanics. He has a diagnosis of gout which may limit his progress with ROM. Following MT this date he demonstrated improved ROM into flx/ext. Return to Play: (if applicable)   []  Stage 1: Intro to Strength   []  Stage 2: Return to Run and Strength   []  Stage 3: Return to Jump and Strength   []  Stage 4: Dynamic Strength and Agility   []  Stage 5: Sport Specific Training     []  Ready to Return to Play, Meets All Above Stages   []  Not Ready for Return to Sports   Comments:                               PLAN: Cont R knee mobility and strengthening   [x] Continue per plan of care [] Alter current plan (see comments above)  [] Plan of care initiated [] Hold pending MD visit [] Discharge      Electronically signed by:  Octavio Wetzel, PT, DPT [facility] [id number]     Note: If patient does not return for scheduled/ recommended follow up visits, this note will serve as a discharge from care along with most recent update on progress.

## 2020-08-10 ENCOUNTER — OFFICE VISIT (OUTPATIENT)
Dept: ORTHOPEDIC SURGERY | Age: 40
End: 2020-08-10

## 2020-08-10 VITALS — WEIGHT: 250 LBS | BODY MASS INDEX: 33.86 KG/M2 | HEIGHT: 72 IN

## 2020-08-10 LAB
CULTURE, JOINT AEROBIC: NORMAL
CULTURE, JOINT ANAEROBIC: NORMAL
GRAM STAIN RESULT: NORMAL

## 2020-08-10 PROCEDURE — 99024 POSTOP FOLLOW-UP VISIT: CPT | Performed by: ORTHOPAEDIC SURGERY

## 2020-08-10 RX ORDER — INDOMETHACIN 75 MG/1
CAPSULE, EXTENDED RELEASE ORAL
Qty: 30 CAPSULE | Refills: 1 | Status: SHIPPED | OUTPATIENT
Start: 2020-08-10

## 2020-08-10 NOTE — PROGRESS NOTES
MD Priscilla Pope, [place]         Orthopaedic Surgery and Sports Medicine    Patient Name: Jyoti Bowens  YOB: 1980  Patient's PCP is Kristofer Perla MD      SUBJECTIVE  Chief Complaint  Post op left knee arthroscopy with partial lateral menisectomy. 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 Date of Surgery: 7/21/2020    History of Present Illness:  Jyoti Bowens is a 36 y.o. male  Here for first follow up of left knee arthroscopy with partial lateral menisectomy. Has started outpatient physical therapy and is going well. The patient is currently ambulating independently    The patient's pain is rated at 1/10. The patient denies fever, wound drainage, increasing redness, pus, increasing swelling. Post op problems reported: none. Taking oral pain medication as needed. Using local cold therapy as needed. Patient has currently been on Indocin 75 mg extended release once daily for his gout. He seems to be doing well with that. Pain Assessment:  Pain Assessment  Location of Pain: Knee  Location Modifiers: Right  Severity of Pain: 0        OBJECTIVE  PHYSICAL EXAM  Vital Signs: There were no vitals filed for this visit. Body mass index is 33.91 kg/m². General Appearance: Patient is adequately groomed with no evidence of malnutrition   Orientation: Patient is alert and oriented to person, place and time  Mood and Affect: Neutral/Euthymic (normal)     Right Knee Examination:  Portals are healing well. Patient removed sutures on his own approximately 3 days ago  Skin is intact. No ecchymosis. Sensation is intact to light touch throughout lower extremity. The portals are clean and dry without drainage. There is no warmth, erythema, or purulent drainage over the incision.    Patient tolerated gentle passive range of motion. DIAGNOSTICS:   No new xrays taken today        ASSESSMENT (Medical Decision Making)    Ryanne Skinner is a 36 y.o. male progressing well from left knee arthroscopy with partial lateral menisectomy. He also had significant synovectomy performed as well. His pathology shows urate crystals which is probably the cause of the majority of his discomfort. PLAN (Medical Decision Making)  Office Procedures:  No orders of the defined types were placed in this encounter. Treatment Plan: At this time would like him to continue his Indocin 75 mg extended release. He is going to get in touch with his primary care physician about further treatment of his gout. He will follow-up with us on a as needed basis    Non-steroidal anti-inflammatories medications (NSAIDs) can be used to assist with pain control and to reduce inflammatory changes. These medications may be over-the-counter or prescribed. We discussed taking the NSAID properly and the precautions. The patient understands that this medication may potentially interfere with other medications. Patient was also instructed to immediately discontinue the medication is there is any possible complication. The patient was informed to contact us if any problems arise or any new questions. All questions were answered today. Dawn Garcia PA-C, scribing for and in the presence of Dr. Talia Kraft   8/10/2020 10:57 AM    I, Dr. Aidan Hughes, personally performed the services described in this documentation as scribed by Scot Marcelo. FIONA Rodriguez in my presence, and it is both accurate and complete. Aidan Hughes MD    This dictation was performed with a verbal recognition program [facility]) and it was checked for errors. It is possible that there are still dictated errors within this office note. If so, please bring any errors to my attention for an addendum.  All efforts were made to ensure that this office note is accurate.

## 2020-08-11 ENCOUNTER — HOSPITAL ENCOUNTER (OUTPATIENT)
Dept: PHYSICAL THERAPY | Age: 40
Setting detail: THERAPIES SERIES
Discharge: HOME | End: 2020-08-11
Payer: COMMERCIAL

## 2020-08-11 PROCEDURE — 97110 THERAPEUTIC EXERCISES: CPT

## 2020-08-11 PROCEDURE — 97112 NEUROMUSCULAR REEDUCATION: CPT

## 2020-08-11 NOTE — FLOWSHEET NOTE
STRENGHT (MMT) R knee ext 4-/5       R hip ER 4/5                                                   RESTRICTIONS/PRECAUTIONS: s/p R lateral menisectomy 7/21; gout     Exercises/Interventions:     Therapeutic Ex (78036) Sets/sec Reps Notes/CUES   Quad sets      SLR 3x10  No Quad lag noted; 2# used on 2-3 sets    bridges      Heel slides    Supine HS stretch 30\"x3     gastroc stretch     Knee flexion A/AROM stretch EOP      SB DKTC       gastroc incline stretch 30\"x3     SB bridges      Bridges ABD RVL     TKE standing BTB     Prone quad stretch  30\"x3     Leg press:      DL 2x15  90#, 110#   2 up 1 down 2x15      SL abd +fwd/bkw over cones 10x ea     Leg extension 2x10, 3\"H  50#   Recumbent bike  5'                 Manual Intervention (20537) '     STM/hawkgrips to R quads, PFJ mobs, TF mobs for knee extension/flexion, seated MWM for R knee flexion                                    NMR re-education (25580)   CUES NEEDED   Step ups/downs 6\" 2x10      Lateral step ups 2x10                                                Therapeutic Activity (21867)      Pt education on current condition, POC, expectations for therapy, and HEP                                          Therapeutic Exercise and NMR EXR  [x] (75643) Provided verbal/tactile cueing for activities related to strengthening, flexibility, endurance, ROM for improvements in LE, proximal hip, and core control with self care, mobility, lifting, ambulation.  [] (95956) Provided verbal/tactile cueing for activities related to improving balance, coordination, kinesthetic sense, posture, motor skill, proprioception  to assist with LE, proximal hip, and core control in self care, mobility, lifting, ambulation and eccentric single leg control.      NMR and Therapeutic Activities:    [x] (03494 or 04793) Provided verbal/tactile cueing for activities related to improving balance, coordination, kinesthetic sense, posture, motor skill, proprioception and motor activation to allow for proper function of core, proximal hip and LE with self care and ADLs  [] (22871) Gait Re-education- Provided training and instruction to the patient for proper LE, core and proximal hip recruitment and positioning and eccentric body weight control with ambulation re-education including up and down stairs     Home Exercise Program:    [x] (09516) Reviewed/Progressed HEP activities related to strengthening, flexibility, endurance, ROM of core, proximal hip and LE for functional self-care, mobility, lifting and ambulation/stair navigation   [] (08697)Reviewed/Progressed HEP activities related to improving balance, coordination, kinesthetic sense, posture, motor skill, proprioception of core, proximal hip and LE for self care, mobility, lifting, and ambulation/stair navigation      Manual Treatments:  PROM / STM / Oscillations-Mobs:  G-I, II, III, IV (PA's, Inf., Post.)  [x] (13461) Provided manual therapy to mobilize LE, proximal hip and/or LS spine soft tissue/joints for the purpose of modulating pain, promoting relaxation,  increasing ROM, reducing/eliminating soft tissue swelling/inflammation/restriction, improving soft tissue extensibility and allowing for proper ROM for normal function with self care, mobility, lifting and ambulation. Modalities:  Declined    [] GAME READY (VASO)- for significant edema, swelling, pain control.      Charges:  Timed Code Treatment Minutes: 40'   Total Treatment Minutes: 36'     [facility] time in/time out:   (and requires time in and out for each CPT code)    [] EVAL (LOW) 48881 (typically 20 minutes face-to-face)  [] EVAL (MOD) 66963 (typically 30 minutes face-to-face)  [] EVAL (HIGH) 45655 (typically 45 minutes face-to-face)  [] RE-EVAL     [x] SF(26089) x   2  [] IONTO  [x] NMR (54886) x  1   [] VASO  [] Manual (52424) x      [] Other:  [] TA x      [] Mech Traction (79381)  [] ES(attended) (36185)      [] ES (un) (75064):       GOALS:   Patient stated goal: Patient will be Progression is slowed due to complexities/Impairments listed. [] Progression has been slowed due to co-morbidities. [x] Plan just implemented, too soon to assess goals progression <30days   [] Goals require adjustment due to lack of progress  [] Patient is not progressing as expected and requires additional follow up with physician  [] Other    Prognosis for POC: [x] Good [] Fair  [] Poor      Patient requires continued skilled intervention: [x] Yes  [] No    Treatment/Activity Tolerance:  [x] Patient able to complete treatment  [] Patient limited by fatigue  [] Patient limited by pain    [] Patient limited by other medical complications  [] Other:     ASSESSMENT: Patient has sig ROM improvement this date with near normal L knee flx and ext. He was able to progress to functional SL strengthening without pain. Pt has a diagnosis of gout which may limit his progress with ROM. Following MT this date he demonstrated improved ROM into flx/ext. Return to Play: (if applicable)   []  Stage 1: Intro to Strength   []  Stage 2: Return to Run and Strength   []  Stage 3: Return to Jump and Strength   []  Stage 4: Dynamic Strength and Agility   []  Stage 5: Sport Specific Training     []  Ready to Return to Play, Meets All Above Stages   []  Not Ready for Return to Sports   Comments:                               PLAN: Cont R knee mobility and strengthening. [x] Continue per plan of care [] Alter current plan (see comments above)  [] Plan of care initiated [] Hold pending MD visit [] Discharge      Electronically signed by:  Jaspreet Ruth PT, DPT [facility] [id number]     Note: If patient does not return for scheduled/ recommended follow up visits, this note will serve as a discharge from care along with most recent update on progress.

## 2020-08-13 ENCOUNTER — HOSPITAL ENCOUNTER (OUTPATIENT)
Dept: PHYSICAL THERAPY | Age: 40
Setting detail: THERAPIES SERIES
Discharge: HOME | End: 2020-08-13
Payer: COMMERCIAL

## 2020-08-13 PROCEDURE — 97110 THERAPEUTIC EXERCISES: CPT

## 2020-08-13 PROCEDURE — 97112 NEUROMUSCULAR REEDUCATION: CPT

## 2020-08-13 NOTE — FLOWSHEET NOTE
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; s/p R lateral menisectomy on   · Treatment Diagnosis: M25.561 R knee pain  Insurance/Certification information:  PT Insurance Information: [employer]  Physician Information:  Referring Practitioner: Nimisha Dorantes MD  Has the plan of care been signed (Y/N):        []  Yes  [x]  No     Date of Patient follow up with Physician: 8/10/2020      Is this a Progress Report:     []  Yes  [x]  No        If Yes:  Date Range for reporting period:  Beginning 2020  Ending    Progress report will be due (10 Rx or 30 days whichever is less): 7362       Recertification will be due (POC Duration  / 90 days whichever is less): 10/27/2020      Visit # Insurance Allowable Requires auth   4 [employer] 40 visits    []no        []yes:     Functional Scale: LEFS: 47/80; 42% disability     Date assessed:  2020      Therapy Diagnosis/Practice Pattern: I     Number of Comorbidities:  []0     [x]1-2    []3+    Latex Allergy:  [x]NO      []YES  Preferred Language for Healthcare:   [x]English       []other:      Pain level:  0/10     SUBJECTIVE:  Patient notes his knee feels \"great\" this morning and he had no pain or discomfort following his last session.       OBJECTIVE: See eval   Observation: improved gait mechanics and reduced antalgic gait pattern    Test measurements:         PT Practice Pattern: I  Co morbidities: 1-2  surgical   INITIAL VISIT  CURRENT VISIT   PAIN  1-2/10     FUNCTIONAL SCALE LEFS       ROM R knee ext Lacking 13 deg  lacking 2 deg      R knee flexion 102 deg 135 deg STRENGHT (MMT) R knee ext 4-/5       R hip ER 4/5                                                   RESTRICTIONS/PRECAUTIONS: s/p R lateral menisectomy 7/21; gout     Exercises/Interventions:     Therapeutic Ex (62346) Sets/sec Reps Notes/CUES   Quad sets      SLR   No Quad lag noted; 2# used on 2-3 sets    bridges      Heel slides    Supine HS stretch      gastroc stretch     Knee flexion A/AROM stretch EOP      SB DKTC       gastroc incline stretch 30\"x3     SB bridges      Bridges ABD RVL     TKE standing BTB     Trustretch HS, HF stretch 30\"x3     Prone quad stretch  30\"x3     Leg press:      DL 2x15  90#, 110#   2 up 1 down 2x15      SL abd +fwd/bkw over cones      Leg extension 2x10, 3\"H  50#   CECIL abd, ext 2x10  75#   CECIL TKE 2x10   75#               Recumbent bike  5'                 Manual Intervention (64326) '     STM/hawkgrips to R quads, PFJ mobs, TF mobs for knee extension/flexion, seated MWM for R knee flexion                                    NMR re-education (69130)   CUES NEEDED   Step ups/downs 6\" 2x10  Focus on eccentric control    Lateral step ups     Bosu step to: fwd, lateral 10x ea  Balance difficulties, improved w increased reps                                       Therapeutic Activity (47590)      Pt education on current condition, POC, expectations for therapy, and HEP                                          Therapeutic Exercise and NMR EXR  [x] (56962) Provided verbal/tactile cueing for activities related to strengthening, flexibility, endurance, ROM for improvements in LE, proximal hip, and core control with self care, mobility, lifting, ambulation.  [] (56953) Provided verbal/tactile cueing for activities related to improving balance, coordination, kinesthetic sense, posture, motor skill, proprioception  to assist with LE, proximal hip, and core control in self care, mobility, lifting, ambulation and eccentric single leg control.      NMR and Therapeutic Activities:    [x] (55293 or 83661) Provided verbal/tactile cueing for activities related to improving balance, coordination, kinesthetic sense, posture, motor skill, proprioception and motor activation to allow for proper function of core, proximal hip and LE with self care and ADLs  [] (78322) Gait Re-education- Provided training and instruction to the patient for proper LE, core and proximal hip recruitment and positioning and eccentric body weight control with ambulation re-education including up and down stairs     Home Exercise Program:    [x] (64799) Reviewed/Progressed HEP activities related to strengthening, flexibility, endurance, ROM of core, proximal hip and LE for functional self-care, mobility, lifting and ambulation/stair navigation   [] (28638)Reviewed/Progressed HEP activities related to improving balance, coordination, kinesthetic sense, posture, motor skill, proprioception of core, proximal hip and LE for self care, mobility, lifting, and ambulation/stair navigation      Manual Treatments:  PROM / STM / Oscillations-Mobs:  G-I, II, III, IV (PA's, Inf., Post.)  [x] (57160) Provided manual therapy to mobilize LE, proximal hip and/or LS spine soft tissue/joints for the purpose of modulating pain, promoting relaxation,  increasing ROM, reducing/eliminating soft tissue swelling/inflammation/restriction, improving soft tissue extensibility and allowing for proper ROM for normal function with self care, mobility, lifting and ambulation. Modalities:  Declined    [] GAME READY (VASO)- for significant edema, swelling, pain control.      Charges:  Timed Code Treatment Minutes: 43'   Total Treatment Minutes: 43'     [facility] time in/time out:   (and requires time in and out for each CPT code)    [] EVAL (LOW) 42165 (typically 20 minutes face-to-face)  [] EVAL (MOD) 45759 (typically 30 minutes face-to-face)  [] EVAL (HIGH) 75761 (typically 45 minutes face-to-face)  [] RE-EVAL     [x] LX(15812) x   2  [] IONTO  [x] NMR (33173) x  1   [] VASO  [] Manual (99516) x      [] Other:  [] TA x      [] Mech Traction (96855)  [] ES(attended) (93107)      [] ES (un) (81380):       GOALS:   Patient stated goal: Patient will be able to squat down without limitations and pain in his R knee. [] Progressing: [] Met: [] Not Met: [] Adjusted    Therapist goals for Patient:   Short Term Goals: To be achieved in: 2 weeks  1. Independent in HEP and progression per patient tolerance, in order to prevent re-injury. [] Progressing: [] Met: [] Not Met: [] Adjusted  2. Patient will have a decrease in pain to facilitate improvement in movement, function, and ADLs as indicated by Functional Deficits. [] Progressing: [] Met: [] Not Met: [] Adjusted    Long Term Goals: To be achieved in: 4 weeks  1. Disability index score of 21% or less for the LEFS to assist with reaching prior level of function. [] Progressing: [] Met: [] Not Met: [] Adjusted  2. Patient will demonstrate increased AROM to 140 deg of R knee flexion to allow for proper joint functioning as indicated by patients Functional Deficits. [] Progressing: [] Met: [] Not Met: [] Adjusted  3. Patient will demonstrate an increase in Strength to good proximal hip strength and control, to 5/5 in R LE to allow for proper functional mobility as indicated by patients Functional Deficits. [] Progressing: [] Met: [] Not Met: [] Adjusted  4. Patient will return to all functional activities without increased symptoms or restriction. [] Progressing: [] Met: [] Not Met: [] Adjusted  5. Patient will be able to kneel down without pain and limitations in his R knee(patient specific functional goal)    [] Progressing: [] Met: [] Not Met: [] Adjusted       Progression Towards Functional goals:  [] Patient is progressing as expected towards functional goals listed. [] Progression is slowed due to complexities listed. [] Progression has been slowed due to co-morbidities.   [x] Plan just implemented, too soon to assess goals progression  [] Other:         Overall Progression Towards Functional goals/ Treatment Progress Update:  [] Patient is progressing as expected towards functional goals listed. [] Progression is slowed due to complexities/Impairments listed. [] Progression has been slowed due to co-morbidities. [x] Plan just implemented, too soon to assess goals progression <30days   [] Goals require adjustment due to lack of progress  [] Patient is not progressing as expected and requires additional follow up with physician  [] Other    Prognosis for POC: [x] Good [] Fair  [] Poor      Patient requires continued skilled intervention: [x] Yes  [] No    Treatment/Activity Tolerance:  [x] Patient able to complete treatment  [] Patient limited by fatigue  [] Patient limited by pain    [] Patient limited by other medical complications  [] Other:     ASSESSMENT: Strengthening was progressed this session to include more functional eccentric quad exercises. Pt tolerated all ex well without pain, improving motor control, and appropriate fatigue following. Pt has a diagnosis of gout which may limit his progress with ROM. Following MT this date he demonstrated improved ROM into flx/ext. Return to Play: (if applicable)   []  Stage 1: Intro to Strength   []  Stage 2: Return to Run and Strength   []  Stage 3: Return to Jump and Strength   []  Stage 4: Dynamic Strength and Agility   []  Stage 5: Sport Specific Training     []  Ready to Return to Play, Meets All Above Stages   []  Not Ready for Return to Sports   Comments:                               PLAN: Cont R knee mobility and strengthening.    [x] Continue per plan of care [] Alter current plan (see comments above)  [] Plan of care initiated [] Hold pending MD visit [] Discharge      Electronically signed by:  Austin Wallace, PT, DPT [facility] [id number]     Note: If patient does not return for scheduled/ recommended follow up visits, this note will

## 2020-08-18 ENCOUNTER — APPOINTMENT (OUTPATIENT)
Dept: PHYSICAL THERAPY | Age: 40
End: 2020-08-18
Payer: COMMERCIAL

## 2020-08-20 ENCOUNTER — HOSPITAL ENCOUNTER (OUTPATIENT)
Dept: PHYSICAL THERAPY | Age: 40
Setting detail: THERAPIES SERIES
Discharge: HOME | End: 2020-08-20
Payer: COMMERCIAL

## 2020-08-20 PROCEDURE — 97112 NEUROMUSCULAR REEDUCATION: CPT

## 2020-08-20 PROCEDURE — 97110 THERAPEUTIC EXERCISES: CPT

## 2020-08-20 NOTE — FLOWSHEET NOTE
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; s/p R lateral menisectomy on   · Treatment Diagnosis: M25.561 R knee pain  Insurance/Certification information:  PT Insurance Information: [employer]  Physician Information:  Referring Practitioner: Donald Kong MD  Has the plan of care been signed (Y/N):        []  Yes  [x]  No     Date of Patient follow up with Physician: 8/10/2020      Is this a Progress Report:     []  Yes  [x]  No        If Yes:  Date Range for reporting period:  Beginning 2020  Ending    Progress report will be due (10 Rx or 30 days whichever is less):        Recertification will be due (POC Duration  / 90 days whichever is less): 10/27/2020      Visit # Insurance Allowable Requires auth   5 [employer] 40 visits    []no        []yes:     Functional Scale: LEFS: 47/80; 42% disability     Date assessed:  2020      Therapy Diagnosis/Practice Pattern: I     Number of Comorbidities:  []0     [x]1-2    []3+    Latex Allergy:  [x]NO      []YES  Preferred Language for Healthcare:   [x]English       []other:      Pain level:  0/10     SUBJECTIVE:  Patient continues to report that his knee feels \"great\" with no pain reported.        OBJECTIVE: See eval   Observation: improved gait mechanics and reduced antalgic gait pattern    Test measurements:         PT Practice Pattern: I  Co morbidities: 1-2  surgical   INITIAL VISIT  CURRENT VISIT   PAIN  1-2/10     FUNCTIONAL SCALE LEFS       ROM R knee ext Lacking 13 deg  lacking 2 deg      R knee flexion 102 deg 135 deg (MOD) 18853 (typically 30 minutes face-to-face)  [] EVAL (HIGH) 36354 (typically 45 minutes face-to-face)  [] RE-EVAL     [x] RS(58697) x   1  [] IONTO  [x] NMR (56836) x  2   [] VASO  [] Manual (56620) x      [] Other:  [] TA x      [] Mech Traction (46476)  [] ES(attended) (76014)      [] ES (un) (59202):       GOALS:   Patient stated goal: Patient will be able to squat down without limitations and pain in his R knee. [] Progressing: [] Met: [] Not Met: [] Adjusted    Therapist goals for Patient:   Short Term Goals: To be achieved in: 2 weeks  1. Independent in HEP and progression per patient tolerance, in order to prevent re-injury. [] Progressing: [] Met: [] Not Met: [] Adjusted  2. Patient will have a decrease in pain to facilitate improvement in movement, function, and ADLs as indicated by Functional Deficits. [] Progressing: [] Met: [] Not Met: [] Adjusted    Long Term Goals: To be achieved in: 4 weeks  1. Disability index score of 21% or less for the LEFS to assist with reaching prior level of function. [] Progressing: [] Met: [] Not Met: [] Adjusted  2. Patient will demonstrate increased AROM to 140 deg of R knee flexion to allow for proper joint functioning as indicated by patients Functional Deficits. [] Progressing: [] Met: [] Not Met: [] Adjusted  3. Patient will demonstrate an increase in Strength to good proximal hip strength and control, to 5/5 in R LE to allow for proper functional mobility as indicated by patients Functional Deficits. [] Progressing: [] Met: [] Not Met: [] Adjusted  4. Patient will return to all functional activities without increased symptoms or restriction. [] Progressing: [] Met: [] Not Met: [] Adjusted  5.  Patient will be able to kneel down without pain and limitations in his R knee(patient specific functional goal)    [] Progressing: [] Met: [] Not Met: [] Adjusted       Progression Towards Functional goals:  [] Patient is progressing as expected towards functional goals listed. [] Progression is slowed due to complexities listed. [] Progression has been slowed due to co-morbidities. [x] Plan just implemented, too soon to assess goals progression  [] Other:         Overall Progression Towards Functional goals/ Treatment Progress Update:  [] Patient is progressing as expected towards functional goals listed. [] Progression is slowed due to complexities/Impairments listed. [] Progression has been slowed due to co-morbidities. [x] Plan just implemented, too soon to assess goals progression <30days   [] Goals require adjustment due to lack of progress  [] Patient is not progressing as expected and requires additional follow up with physician  [] Other    Prognosis for POC: [x] Good [] Fair  [] Poor      Patient requires continued skilled intervention: [x] Yes  [] No    Treatment/Activity Tolerance:  [x] Patient able to complete treatment  [] Patient limited by fatigue  [] Patient limited by pain    [] Patient limited by other medical complications  [] Other:     ASSESSMENT: Strengthening was progressed this session to include more functional eccentric quad exercises as well as SL strengthening and control. Pt tolerated all ex well without pain, improving motor control, and appropriate fatigue following. PT discussed with pt upcoming D/C should he continue to progress well. Pt has a diagnosis of gout which may limit his progress with ROM. Following MT this date he demonstrated improved ROM into flx/ext. Return to Play: (if applicable)   []  Stage 1: Intro to Strength   []  Stage 2: Return to Run and Strength   []  Stage 3: Return to Jump and Strength   []  Stage 4: Dynamic Strength and Agility   []  Stage 5: Sport Specific Training     []  Ready to Return to Play, Meets All Above Stages   []  Not Ready for Return to Sports   Comments:                               PLAN: 1-2 more visits and then D/C to HEP.    [x] Continue per plan of care [] Alter current plan (see comments above)  [] Plan of care initiated [] Hold pending MD visit [] Discharge      Electronically signed by:  Kemar Zamora PT, DPT [facility] [id number]     Note: If patient does not return for scheduled/ recommended follow up visits, this note will serve as a discharge from care along with most recent update on progress.

## 2020-08-24 LAB
FUNGUS (MYCOLOGY) CULTURE: NORMAL
FUNGUS (MYCOLOGY) CULTURE: NORMAL
FUNGUS STAIN: NORMAL
FUNGUS STAIN: NORMAL

## 2020-08-25 ENCOUNTER — HOSPITAL ENCOUNTER (OUTPATIENT)
Dept: PHYSICAL THERAPY | Age: 40
Setting detail: THERAPIES SERIES
Discharge: HOME | End: 2020-08-25
Payer: COMMERCIAL

## 2020-08-25 PROCEDURE — 97112 NEUROMUSCULAR REEDUCATION: CPT

## 2020-08-25 PROCEDURE — 97110 THERAPEUTIC EXERCISES: CPT

## 2020-08-25 NOTE — FLOWSHEET NOTE
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; s/p R lateral menisectomy on   · Treatment Diagnosis: M25.561 R knee pain  Insurance/Certification information:  PT Insurance Information: [employer]  Physician Information:  Referring Practitioner: Lauryn Park MD  Has the plan of care been signed (Y/N):        [x]  Yes  []  No     Date of Patient follow up with Physician: 8/10/2020      Is this a Progress Report:     [x]  Yes  []  No        If Yes:  Date Range for reporting period:  Beginning 2020  Ending 2020    Progress report will be due (10 Rx or 30 days whichever is less):        Recertification will be due (POC Duration  / 90 days whichever is less): 10/27/2020      Visit # Insurance Allowable Requires auth   6 [employer] 40 visits    []no        []yes:     Functional Scale: LEFS: 47/80; 42% disability     Date assessed:  2020      Therapy Diagnosis/Practice Pattern: I     Number of Comorbidities:  []0     [x]1-2    []3+    Latex Allergy:  [x]NO      []YES  Preferred Language for Healthcare:   [x]English       []other:      Pain level:  0/10     SUBJECTIVE:  Patient reports his knee has felt great and he has had no issues since his last session.         OBJECTIVE: See eval   Observation: improved gait mechanics and resolution of antalgic gait pattern    Test measurements:         PT Practice Pattern: I  Co morbidities: 1-2  surgical   INITIAL VISIT  CURRENT VISIT  2020   PAIN  1-2/10 0/10    FUNCTIONAL SCALE LEFS  47/80; 41% disability  79/80; 1% disability    ROM R knee ext Lacking 13 deg 0 deg R knee flexion 102 deg 140 deg                                    STRENGHT (MMT) R knee ext 4-/5 5/5      R hip ER 4/5  5/5                                                 RESTRICTIONS/PRECAUTIONS: s/p R lateral menisectomy 7/21; gout     Exercises/Interventions:     Therapeutic Ex (06325) Sets/sec Reps Notes/CUES   Quad sets      SLR   No Quad lag noted; 2# used on 2-3 sets    bridges      Heel slides    Supine HS stretch      gastroc stretch     Knee flexion A/AROM stretch EOP      SB DKTC       gastroc incline stretch 30\"x3     SB bridges      Bridges ABD RVL     TKE standing BTB     Trustretch HS, HF stretch 30\"x3     Prone quad stretch  30\"x3     Leg press:      DL 2x15  150#   2 up 1 down 2x15   120#   SL abd +fwd/bkw over cones     Leg extension  50#   CECIL abd, ext  75#   CECIL TKE  75#   LWB RVL     Monster walks           Recumbent bike  5'     Pt education on D/C and prevention of further injury 5'           Manual Intervention (08174) '     STM/hawkgrips to R quads, PFJ mobs, TF mobs for knee extension/flexion, seated MWM for R knee flexion      IASTM to R patellar tendon                              NMR re-education (34248)   CUES NEEDED   Step ups/downs 6\" 2x10  Focus on eccentric control    Lateral step downs 6\"  2x10     Bosu step to: fwd, lateral 10x ea  improved control from previous visit   Step to on airex: fwd, lateral, fwd+ lateral step off 10x ea     Piston squats R LE DL squats off low plinth                    Therapeutic Activity (28765)      Pt education on current condition, POC, expectations for therapy, and HEP                                          Therapeutic Exercise and NMR EXR  [x] (45171) Provided verbal/tactile cueing for activities related to strengthening, flexibility, endurance, ROM for improvements in LE, proximal hip, and core control with self care, mobility, lifting, ambulation.  [] (40601) Provided verbal/tactile cueing for activities related to improving balance, code)    [] EVAL (LOW) 24667 (typically 20 minutes face-to-face)  [] EVAL (MOD) 49380 (typically 30 minutes face-to-face)  [] EVAL (HIGH) 62627 (typically 45 minutes face-to-face)  [] RE-EVAL     [x] KG(90407) x   2  [] IONTO  [x] NMR (00381) x  1  [] VASO  [] Manual (74547) x      [] Other:  [] TA x      [] Mech Traction (21394)  [] ES(attended) (58354)      [] ES (un) (98847):       GOALS:   Patient stated goal: Patient will be able to squat down without limitations and pain in his R knee. [] Progressing: [x] Met: [] Not Met: [] Adjusted    Therapist goals for Patient:   Short Term Goals: To be achieved in: 2 weeks  1. Independent in HEP and progression per patient tolerance, in order to prevent re-injury. [] Progressing: [x] Met: [] Not Met: [] Adjusted  2. Patient will have a decrease in pain to facilitate improvement in movement, function, and ADLs as indicated by Functional Deficits. [] Progressing: [x] Met: [] Not Met: [] Adjusted    Long Term Goals: To be achieved in: 4 weeks  1. Disability index score of 21% or less for the LEFS to assist with reaching prior level of function. [] Progressing: [x] Met: [] Not Met: [] Adjusted  2. Patient will demonstrate increased AROM to 140 deg of R knee flexion to allow for proper joint functioning as indicated by patients Functional Deficits. [] Progressing: [x] Met: [] Not Met: [] Adjusted  3. Patient will demonstrate an increase in Strength to good proximal hip strength and control, to 5/5 in R LE to allow for proper functional mobility as indicated by patients Functional Deficits. [] Progressing: [x] Met: [] Not Met: [] Adjusted  4. Patient will return to all functional activities without increased symptoms or restriction. [] Progressing: [x] Met: [] Not Met: [] Adjusted  5.  Patient will be able to kneel down without pain and limitations in his R knee(patient specific functional goal)    [] Progressing: [x] Met: [] Not Met: [] Adjusted       Progression Towards Functional goals:  [x] Patient is progressing as expected towards functional goals listed. [] Progression is slowed due to complexities listed. [] Progression has been slowed due to co-morbidities. [] Plan just implemented, too soon to assess goals progression  [] Other:         Overall Progression Towards Functional goals/ Treatment Progress Update:  [x] Patient is progressing as expected towards functional goals listed. [] Progression is slowed due to complexities/Impairments listed. [] Progression has been slowed due to co-morbidities. [] Plan just implemented, too soon to assess goals progression <30days   [] Goals require adjustment due to lack of progress  [] Patient is not progressing as expected and requires additional follow up with physician  [] Other    Prognosis for POC: [x] Good [] Fair  [] Poor      Patient requires continued skilled intervention: [x] Yes  [] No    Treatment/Activity Tolerance:  [x] Patient able to complete treatment  [] Patient limited by fatigue  [] Patient limited by pain    [] Patient limited by other medical complications  [] Other:     ASSESSMENT: Patient has met nearly all of his functional goals and has been able to return to ADLs and work-related activities without limitations. His Right knee ROM is symmetrical in regards to his R knee and he has no pain during. Mild swelling is still noted superior to his PFJ but this is likely due to his diagnosis of gout for which he has an appt with his primary care regarding. His SL motor control has improved and he has been able to progress SL strengthening with his HEP. PT educated patient on discharge plans and continuation of his HEP in order to reduce his risk for further injury. Patient verbalized understanding.            Return to Play: (if applicable)   []  Stage 1: Intro to Strength   []  Stage 2: Return to Run and Strength   []  Stage 3: Return to Jump and Strength   []  Stage 4: Dynamic Strength and Agility   []  Stage 5: Sport Specific Training     []  Ready to Return to Play, Meets All Above Stages   []  Not Ready for Return to Sports   Comments:                               PLAN:  D/C to HEP. [] Continue per plan of care [] Alter current plan (see comments above)  [] Plan of care initiated [] Hold pending MD visit [x] Discharge      Electronically signed by:  Alexus Doe PT, DPT [facility] [id number]     Note: If patient does not return for scheduled/ recommended follow up visits, this note will serve as a discharge from care along with most recent update on progress.

## 2020-08-27 ENCOUNTER — APPOINTMENT (OUTPATIENT)
Dept: PHYSICAL THERAPY | Age: 40
End: 2020-08-27
Payer: COMMERCIAL

## 2020-09-08 LAB
AFB CULTURE (MYCOBACTERIA): NORMAL
AFB CULTURE (MYCOBACTERIA): NORMAL
AFB SMEAR: NORMAL
AFB SMEAR: NORMAL

## 2024-01-31 ENCOUNTER — OFFICE VISIT (OUTPATIENT)
Dept: ORTHOPEDIC SURGERY | Age: 44
End: 2024-01-31
Payer: COMMERCIAL

## 2024-01-31 VITALS — WEIGHT: 250 LBS | RESPIRATION RATE: 16 BRPM | BODY MASS INDEX: 33.86 KG/M2 | HEIGHT: 72 IN

## 2024-01-31 DIAGNOSIS — M70.21 OLECRANON BURSITIS OF RIGHT ELBOW: Primary | ICD-10-CM

## 2024-01-31 PROCEDURE — 99203 OFFICE O/P NEW LOW 30 MIN: CPT | Performed by: PHYSICIAN ASSISTANT

## 2024-01-31 NOTE — PROGRESS NOTES
Mr. Edwardo Munoz is a 43 y.o. right handed man  who is seen today in [facility] at the request of Shahid Kapadia MD.    He presents today regarding right symptoms  of posterior elbow swelling which have been present for approximately 3 weeks.  A history of antecedent trauma or injury is Absent.  He reports symptoms to include mild pain at the posterior aspect of the left  elbow(s).  Symptoms are worse with certain activities and direct pressure over the Olecranon.  Pain is not worse with use.  He does not report any previous treatment for this concern.  He has not had an attempted aspiration of the fluid within the Olecranon bursa previously.  Symptoms show no change over time.      Previous treatment has included conservative measures.  He does not claim relation of his symptoms to his required work activities.  He has not undergone any form of testing.    I have today reviewed with Edwardo Munoz the clinically relevant, past medical history, medications, allergies,  family history, social history, and Review Of Systems & I have documented any details relevant to today's presenting complaints in my history above.  Mr. Edwardo Munoz's self-reported past medical history, medications, allergies,  family history, social history, and Review Of Systems have been scanned into the chart under the \"Media\" tab.    Physical Exam:  Mr. Edwardo Munoz's most recent vitals:  Vitals  Respirations: 16  Height: 182.9 cm (6')  Weight - Scale: 113.4 kg (250 lb)    He is well nourished, oriented to person, place & time.  He demonstrates appropriate mood and affect as well as normal gait and station.    Skin: Normal in appearance, Normal Color, and Normal Texture Bilaterally   Digital range of motion is Full bilaterally  Wrist range of motion is Full bilaterally  Elbow range of motion is Full bilaterally  Sensation is objectively normal bilaterally.  There is a Negative Tinnel's sign

## 2025-03-21 ENCOUNTER — OFFICE VISIT (OUTPATIENT)
Dept: URGENT CARE | Age: 45
End: 2025-03-21

## 2025-03-21 VITALS
DIASTOLIC BLOOD PRESSURE: 80 MMHG | OXYGEN SATURATION: 97 % | HEIGHT: 74 IN | BODY MASS INDEX: 28.26 KG/M2 | WEIGHT: 220.2 LBS | SYSTOLIC BLOOD PRESSURE: 122 MMHG | TEMPERATURE: 98.1 F | HEART RATE: 72 BPM | RESPIRATION RATE: 20 BRPM

## 2025-03-21 DIAGNOSIS — H10.13 ALLERGIC CONJUNCTIVITIS OF BOTH EYES: Primary | ICD-10-CM

## 2025-03-21 PROBLEM — R07.2 PRECORDIAL CHEST PAIN: Status: RESOLVED | Noted: 2019-09-12 | Resolved: 2025-03-21

## 2025-03-21 PROBLEM — I25.2 HISTORY OF MI (MYOCARDIAL INFARCTION): Status: ACTIVE | Noted: 2017-06-22

## 2025-03-21 PROBLEM — I10 BENIGN ESSENTIAL HYPERTENSION: Status: ACTIVE | Noted: 2017-06-22

## 2025-03-21 RX ORDER — MULTIVITAMIN
1 TABLET ORAL DAILY
COMMUNITY

## 2025-03-21 RX ORDER — FLURBIPROFEN SODIUM 0.3 MG/ML
SOLUTION/ DROPS OPHTHALMIC
COMMUNITY
Start: 2025-01-02

## 2025-03-21 ASSESSMENT — VISUAL ACUITY: OU: 1

## 2025-03-21 NOTE — PATIENT INSTRUCTIONS
options that can lessen symptoms.  Recommend warm compresses over the symptomatic ear(s) for 10-15 minutes, or a hot shower, followed by 1-2 minutes of massaging the area behind your ears and down the jaw-line to help with the ear congestion/ear pressure.    Follow up with your PCP within 5 days or, if unable, you can return to the clinic if symptoms persist beyond 5 days or if symptoms worsen.    If you develop shortness of breath, increased work of breathing, lightheadedness, or chest pain, contact 911, or follow up immediately with the nearest Emergency Department for further evaluation.    New Prescriptions    NAPHAZOLINE-PHENIRAMINE 0.027-0.315 % SOLN    Apply 2 drops to eye 4 times daily as needed (eye redness and itching) Do not use for more than 2 weeks straight.    OLOPATADINE HCL 0.7 % SOLN    Apply 1 drop to eye daily as needed (for redness and irritation)

## 2025-03-21 NOTE — PROGRESS NOTES
General: Vision grossly intact. Gaze aligned appropriately.         Right eye: No foreign body, discharge or hordeolum.         Left eye: No foreign body, discharge or hordeolum.      Extraocular Movements: Extraocular movements intact.      Conjunctiva/sclera:      Right eye: Right conjunctiva is injected. No chemosis, exudate or hemorrhage.     Left eye: Left conjunctiva is injected. No chemosis, exudate or hemorrhage.     Pupils: Pupils are equal, round, and reactive to light.      Comments: Increased clear tearing of the eyes bilaterally.   Cardiovascular:      Rate and Rhythm: Normal rate and regular rhythm.      Heart sounds: Normal heart sounds.   Pulmonary:      Effort: Pulmonary effort is normal.      Breath sounds: Normal breath sounds.   Musculoskeletal:      Cervical back: Full passive range of motion without pain, normal range of motion and neck supple. No rigidity. Normal range of motion.   Skin:     General: Skin is warm and dry.   Neurological:      Mental Status: He is alert and oriented to person, place, and time.   Psychiatric:         Attention and Perception: Attention normal.         Speech: Speech normal.         Behavior: Behavior is cooperative.         PROCEDURES:  Unless otherwise noted below, none     Procedures    RESULTS:  No results found for this visit on 03/21/25.      An electronic signature was used to authenticate this note.  --Dedrick Sahu PA-C      This chart was generated in part by using Dragon Dictation system and may contain errors related to that system including errors in grammar, punctuation, and spelling, as well as words and phrases that may be inappropriate. If there are any questions or concerns please feel free to contact the dictating provider for clarification.

## (undated) DEVICE — CATHETER IV 20GA L1.25IN PNK FEP SFTY STR HUB RADPQ DISP

## (undated) DEVICE — PACK PROCEDURE SURG ARTHSCP CUST

## (undated) DEVICE — GAUZE,SPONGE,4"X4",16PLY,STRL,LF,10/TRAY: Brand: MEDLINE

## (undated) DEVICE — GLOVE ORTHO 8   MSG9480

## (undated) DEVICE — 4-PORT MANIFOLD: Brand: NEPTUNE 2

## (undated) DEVICE — Z CONVERTED USE 2273232 BANDAGE COMPR W6INXL11YD E KNIT DBL SELF CLSR EZE-BAND

## (undated) DEVICE — PADDING CAST W6INXL4YD ST COT RAYON MICROPLEATED HIGHLY

## (undated) DEVICE — SET ADMIN PRIMING 7ML L30IN 7.35LB 20 GTT 2ND RLER CLMP

## (undated) DEVICE — SOLUTION IV 1000ML LAC RINGERS PH 6.5 INJ USP VIAFLX PLAS

## (undated) DEVICE — GOWN,REINFORCED,POLY,AURORA,XXLARGE,STR: Brand: MEDLINE

## (undated) DEVICE — GOWN,SIRUS,POLYRNF,SETINSLV,L,20/CS: Brand: MEDLINE

## (undated) DEVICE — SET GRAV VENT NVENT CK VLV 3 NDL FREE PRT 10 GTT

## (undated) DEVICE — PACK COOL L W14XL6.5IN 3 LAYR CONSTR SFT CLP CLSR 4 TIE

## (undated) DEVICE — TUBING, SUCTION, 3/16" X 20', STRAIGHT: Brand: MEDLINE

## (undated) DEVICE — PADDING CAST W6INXL4YD NONSTERILE COT RAYON MICROPLEATED

## (undated) DEVICE — GLOVE ORANGE PI 8   MSG9080

## (undated) DEVICE — SUTURE NONABSORBABLE MONOFILAMENT 3-0 PS-1 18 IN BLK ETHILON 1663H

## (undated) DEVICE — 3.5 MM FULL RADIUS STRAIGHT                                    BLADES, POWER/EP-1, BEIGE, PACKAGED                                    6 PER BOX, STERILE

## (undated) DEVICE — GLOVE ORANGE PI 7   MSG9070

## (undated) DEVICE — GLOVE ORANGE PI 7 1/2   MSG9075

## (undated) DEVICE — 3M™ TEGADERM™ TRANSPARENT FILM DRESSING FRAME STYLE, 1624W, 2-3/8 IN X 2-3/4 IN (6 CM X 7 CM), 100/CT 4CT/CASE: Brand: 3M™ TEGADERM™

## (undated) DEVICE — TUBE IRRIG L8IN LNG PT W/ CONN FOR PMP SYS REDEUCE

## (undated) DEVICE — T-DRAPE,EXTREMITY,STERILE: Brand: MEDLINE

## (undated) DEVICE — SOLUTION IRRIG 5L LAC R BG

## (undated) DEVICE — ZIMMER® STERILE DISPOSABLE TOURNIQUET CUFF WITH PLC, DUAL PORT, SINGLE BLADDER, 30 IN. (76 CM)